# Patient Record
Sex: FEMALE | Race: WHITE | NOT HISPANIC OR LATINO | Employment: FULL TIME | URBAN - METROPOLITAN AREA
[De-identification: names, ages, dates, MRNs, and addresses within clinical notes are randomized per-mention and may not be internally consistent; named-entity substitution may affect disease eponyms.]

---

## 2018-12-11 ENCOUNTER — HOSPITAL ENCOUNTER (OUTPATIENT)
Dept: RADIOLOGY | Facility: HOSPITAL | Age: 33
Discharge: HOME | End: 2018-12-11
Attending: INTERNAL MEDICINE
Payer: COMMERCIAL

## 2018-12-11 ENCOUNTER — OFFICE VISIT (OUTPATIENT)
Dept: INTERNAL MEDICINE | Facility: CLINIC | Age: 33
End: 2018-12-11
Payer: COMMERCIAL

## 2018-12-11 VITALS
SYSTOLIC BLOOD PRESSURE: 113 MMHG | TEMPERATURE: 98 F | BODY MASS INDEX: 27.89 KG/M2 | HEART RATE: 61 BPM | RESPIRATION RATE: 16 BRPM | WEIGHT: 157.44 LBS | DIASTOLIC BLOOD PRESSURE: 82 MMHG | HEIGHT: 63 IN

## 2018-12-11 DIAGNOSIS — S69.92XA FINGER INJURY, LEFT, INITIAL ENCOUNTER: ICD-10-CM

## 2018-12-11 DIAGNOSIS — R09.82 POST-NASAL DRIP: ICD-10-CM

## 2018-12-11 DIAGNOSIS — Z00.00 ANNUAL PHYSICAL EXAM: Primary | ICD-10-CM

## 2018-12-11 PROCEDURE — 73140 X-RAY EXAM OF FINGER(S): CPT | Mod: 26,LT,, | Performed by: RADIOLOGY

## 2018-12-11 PROCEDURE — 99385 PREV VISIT NEW AGE 18-39: CPT | Mod: 25,S$GLB,, | Performed by: INTERNAL MEDICINE

## 2018-12-11 PROCEDURE — 90686 IIV4 VACC NO PRSV 0.5 ML IM: CPT | Mod: S$GLB,,, | Performed by: INTERNAL MEDICINE

## 2018-12-11 PROCEDURE — 73140 X-RAY EXAM OF FINGER(S): CPT | Mod: TC,PO,LT

## 2018-12-11 PROCEDURE — 99999 PR PBB SHADOW E&M-NEW PATIENT-LVL IV: CPT | Mod: PBBFAC,,, | Performed by: INTERNAL MEDICINE

## 2018-12-11 PROCEDURE — 90471 IMMUNIZATION ADMIN: CPT | Mod: S$GLB,,, | Performed by: INTERNAL MEDICINE

## 2018-12-11 RX ORDER — FLUTICASONE PROPIONATE 50 MCG
2 SPRAY, SUSPENSION (ML) NASAL DAILY
Qty: 16 G | Refills: 6 | Status: SHIPPED | OUTPATIENT
Start: 2018-12-11 | End: 2019-01-10

## 2018-12-11 RX ORDER — DESLORATADINE 5 MG/1
5 TABLET ORAL DAILY
Qty: 30 TABLET | Refills: 11 | Status: SHIPPED | OUTPATIENT
Start: 2018-12-11 | End: 2021-05-18

## 2018-12-11 NOTE — PROGRESS NOTES
Subjective:       Patient ID: Grecia Carson is a 33 y.o. female.    Chief Complaint: Annual Exam    HPI    33 y.o. female here for annual exam.     Health Maintenance:   Lipid disorders/ASCVD risk: due    DM: A1c due  Cervical Cancer (21-65y):  due      Vaccines:   Influenza (yearly) due   Tetanus (every 10 yrs - 1st tdap) tdap 2014         Medical Problems:  1. Cough, congestion, rhinorrhea, post nasal drip: symptoms have been present for a long time. She has tried an otc cough syrup without relief.   2. Left finger pain: she reports a puncture injury due to a palm tree about one year ago. She denies any retained foreign object. She reports pain in that spot if she has to carry something heavy.       History reviewed. No pertinent past medical history.  Past Surgical History:   Procedure Laterality Date    CATARACT EXTRACTION, BILATERAL  2017     Family History   Problem Relation Age of Onset    No Known Problems Mother     No Known Problems Father      Social History     Socioeconomic History    Marital status:      Spouse name: Not on file    Number of children: Not on file    Years of education: Not on file    Highest education level: Not on file   Social Needs    Financial resource strain: Not on file    Food insecurity - worry: Not on file    Food insecurity - inability: Not on file    Transportation needs - medical: Not on file    Transportation needs - non-medical: Not on file   Occupational History    Not on file   Tobacco Use    Smoking status: Never Smoker   Substance and Sexual Activity    Alcohol use: No    Drug use: No    Sexual activity: Not on file   Other Topics Concern    Not on file   Social History Narrative    Not on file     Review of patient's allergies indicates:  No Known Allergies    Current Outpatient Medications:     desloratadine (CLARINEX) 5 mg tablet, Take 1 tablet (5 mg total) by mouth once daily., Disp: 30 tablet, Rfl: 11    fluticasone (FLONASE) 50  "mcg/actuation nasal spray, 2 sprays (100 mcg total) by Each Nare route once daily., Disp: 16 g, Rfl: 6      Review of Systems   Constitutional: Negative for activity change and fever.   HENT: Positive for congestion, postnasal drip and rhinorrhea. Negative for sinus pain and trouble swallowing.    Eyes: Negative for discharge and redness.   Respiratory: Positive for cough. Negative for shortness of breath.    Cardiovascular: Negative for chest pain and leg swelling.   Gastrointestinal: Negative for abdominal pain, blood in stool, constipation and diarrhea.   Genitourinary: Negative for difficulty urinating and dysuria.   Musculoskeletal: Positive for arthralgias (distal left middle finger pain). Negative for gait problem and joint swelling.   Skin: Negative for pallor, rash and wound.   Neurological: Negative for numbness and headaches.       Objective:        Vitals:    12/11/18 1403   BP: 113/82   Pulse: 61   Resp: 16   Temp: 98.4 °F (36.9 °C)   TempSrc: Oral   Weight: 71.4 kg (157 lb 6.5 oz)   Height: 5' 3" (1.6 m)       Body mass index is 27.88 kg/m².    Physical Exam   Constitutional: She is oriented to person, place, and time. She appears well-developed. No distress.   HENT:   Head: Normocephalic and atraumatic.   Right Ear: Tympanic membrane normal.   Left Ear: Tympanic membrane normal.   Nose: Nose normal.   Mouth/Throat: Oropharynx is clear and moist.   Eyes: Conjunctivae and EOM are normal.   Neck: Normal range of motion. Neck supple. No tracheal deviation present. No thyromegaly present.   Cardiovascular: Normal rate, regular rhythm, normal heart sounds and intact distal pulses.   Pulmonary/Chest: Effort normal and breath sounds normal. No respiratory distress. She has no wheezes. She has no rales.   Abdominal: Soft. Bowel sounds are normal. She exhibits no distension. There is no tenderness.   Musculoskeletal: Normal range of motion. She exhibits no edema.        Left hand: She exhibits swelling (middle " finger- palmar side distal to DIP). She exhibits no tenderness and normal capillary refill.   Lymphadenopathy:     She has no cervical adenopathy.   Neurological: She is alert and oriented to person, place, and time. No sensory deficit.   Skin: Skin is warm and dry. She is not diaphoretic. No cyanosis. Nails show no clubbing.   Psychiatric: She has a normal mood and affect. Her behavior is normal. Judgment normal.       Assessment:     1. Annual physical exam    2. Post-nasal drip    3. Finger injury, left, initial encounter           Plan:          1. Annual physical exam  - flu shot today  - CBC auto differential; Future  - Comprehensive metabolic panel; Future  - Hemoglobin A1c; Future  - TSH; Future  - Lipid panel; Future  - Ambulatory Referral to Gynecology    2. Post-nasal drip  - fluticasone (FLONASE) 50 mcg/actuation nasal spray; 2 sprays (100 mcg total) by Each Nare route once daily.  Dispense: 16 g; Refill: 6  - desloratadine (CLARINEX) 5 mg tablet; Take 1 tablet (5 mg total) by mouth once daily.  Dispense: 30 tablet; Refill: 11    3. Finger injury, left, initial encounter  - X-Ray Finger 2 or More Views Left; Future           Patient note was created using MModal Dictation.  Any errors in syntax or even information may not have been identified and edited on initial review prior to signing this note.

## 2018-12-12 ENCOUNTER — PATIENT MESSAGE (OUTPATIENT)
Dept: INTERNAL MEDICINE | Facility: CLINIC | Age: 33
End: 2018-12-12

## 2019-09-21 ENCOUNTER — HOSPITAL ENCOUNTER (EMERGENCY)
Facility: HOSPITAL | Age: 34
Discharge: HOME | End: 2019-09-22
Attending: EMERGENCY MEDICINE
Payer: COMMERCIAL

## 2019-09-21 DIAGNOSIS — J40 BRONCHITIS: Primary | ICD-10-CM

## 2019-09-21 DIAGNOSIS — R05.9 COUGH: ICD-10-CM

## 2019-09-21 LAB
B-HCG UR QL: NEGATIVE
CTP QC/QA: YES
DEPRECATED S PYO AG THROAT QL EIA: NEGATIVE
INFLUENZA A, MOLECULAR: NEGATIVE
INFLUENZA B, MOLECULAR: NEGATIVE
SPECIMEN SOURCE: NORMAL

## 2019-09-21 PROCEDURE — 81025 URINE PREGNANCY TEST: CPT | Performed by: EMERGENCY MEDICINE

## 2019-09-21 PROCEDURE — 99284 EMERGENCY DEPT VISIT MOD MDM: CPT | Mod: 25

## 2019-09-21 PROCEDURE — 87081 CULTURE SCREEN ONLY: CPT

## 2019-09-21 PROCEDURE — 87502 INFLUENZA DNA AMP PROBE: CPT

## 2019-09-21 PROCEDURE — 87880 STREP A ASSAY W/OPTIC: CPT

## 2019-09-21 RX ORDER — ALBUTEROL SULFATE 90 UG/1
1-2 AEROSOL, METERED RESPIRATORY (INHALATION) EVERY 6 HOURS PRN
Qty: 1 INHALER | Refills: 0 | Status: SHIPPED | OUTPATIENT
Start: 2019-09-21 | End: 2021-05-18

## 2019-09-21 RX ORDER — IBUPROFEN 600 MG/1
600 TABLET ORAL EVERY 6 HOURS PRN
Qty: 20 TABLET | Refills: 0 | Status: SHIPPED | OUTPATIENT
Start: 2019-09-21 | End: 2021-05-18

## 2019-09-21 RX ORDER — BENZONATATE 100 MG/1
100 CAPSULE ORAL 3 TIMES DAILY PRN
Qty: 20 CAPSULE | Refills: 0 | Status: SHIPPED | OUTPATIENT
Start: 2019-09-21 | End: 2019-10-01

## 2019-09-22 VITALS
HEART RATE: 88 BPM | RESPIRATION RATE: 20 BRPM | DIASTOLIC BLOOD PRESSURE: 77 MMHG | SYSTOLIC BLOOD PRESSURE: 119 MMHG | TEMPERATURE: 98 F | BODY MASS INDEX: 31.76 KG/M2 | WEIGHT: 186 LBS | OXYGEN SATURATION: 100 % | HEIGHT: 64 IN

## 2019-09-23 NOTE — ED PROVIDER NOTES
Encounter Date: 9/21/2019       History     Chief Complaint   Patient presents with    Sore Throat     Patient presents to the ED with reports of having a sore throat with body aches, and cough with yellow sputum production that started yesterday.     Generalized Body Aches    Otalgia     bilaterally     Took an antibiotic purchase at a flea market with no relief.    The history is provided by the patient.   URI   The primary symptoms include fever (Subjective), sore throat and cough. Primary symptoms do not include vomiting. The current episode started yesterday. This is a new problem. The problem has not changed since onset.  There is nondescript sputum produced.   The onset of the illness is associated with exposure to sick contacts.     Review of patient's allergies indicates:  No Known Allergies  No past medical history on file.  Past Surgical History:   Procedure Laterality Date    CATARACT EXTRACTION, BILATERAL  2017     Family History   Problem Relation Age of Onset    No Known Problems Mother     No Known Problems Father      Social History     Tobacco Use    Smoking status: Never Smoker   Substance Use Topics    Alcohol use: No    Drug use: No     Review of Systems   Constitutional: Positive for fever (Subjective).   HENT: Positive for sore throat.    Respiratory: Positive for cough. Negative for shortness of breath.    Cardiovascular: Negative for chest pain and palpitations.   Gastrointestinal: Negative for vomiting.   Musculoskeletal: Negative for back pain.       Physical Exam     Initial Vitals [09/21/19 2054]   BP Pulse Resp Temp SpO2   136/65 79 20 98.9 °F (37.2 °C) 100 %      MAP       --         Physical Exam    Nursing note and vitals reviewed.  Constitutional: She appears well-developed and well-nourished.   HENT:   Head: Normocephalic.   Mouth/Throat: Oropharynx is clear and moist.   Eyes: Conjunctivae and EOM are normal.   Neck: Normal range of motion. Neck supple.   Cardiovascular:  Normal rate, regular rhythm, normal heart sounds and intact distal pulses.   No murmur heard.  Pulmonary/Chest: Breath sounds normal. No respiratory distress.   Abdominal: Soft.   Musculoskeletal: Normal range of motion. She exhibits no edema or tenderness.   Neurological: She is alert and oriented to person, place, and time. She has normal strength.   Skin: Skin is warm and dry. Capillary refill takes less than 2 seconds. No rash noted.   Psychiatric: She has a normal mood and affect.         ED Course   Procedures  Labs Reviewed   INFLUENZA A & B BY MOLECULAR   THROAT SCREEN, RAPID   CULTURE, STREP A,  THROAT   POCT URINE PREGNANCY          Imaging Results          X-Ray Chest PA And Lateral (Final result)  Result time 09/21/19 23:13:21    Final result by Jimy Sewell MD (09/21/19 23:13:21)                 Impression:      Normal.      Electronically signed by: Jimy Sewell  Date:    09/21/2019  Time:    23:13             Narrative:    EXAMINATION:  XR CHEST PA AND LATERAL    CLINICAL HISTORY:  Cough    TECHNIQUE:  PA and lateral views of the chest were performed.    COMPARISON:  None    FINDINGS:  Frontal lateral views.  Heart and lungs are normal.  No pneumothorax or pleural effusion.  Hilar shadows and trachea appear normal.  Visualized spine and abdomen appear normal.                              X-Rays:   Independently Interpreted Readings:   Chest X-Ray: No acute abnormalities.     Medical Decision Making:   Differential Diagnosis:   Differential Diagnosis includes, but is not limited to:  Sepsis, meningitis, cavernous sinus thrombosis, nasal foreign body, otitis media/external, nasal polyp, bacterial sinusitis, allergic rhinitis, influenza, bacterial/viral pharyngitis, peritonsillar abscess, retropharyngeal abscess, bacterial/viral pneumonia.    Independently Interpreted Test(s):   I have ordered and independently interpreted X-rays - see prior notes.  Clinical Tests:   Lab Tests: Reviewed and  Ordered  Radiological Study: Ordered and Reviewed  ED Management:  After complete evaluation, including thorough history and physical exam, the patient's symptoms are most likely due to viral upper respiratory infection. There are no concerning features on physical exam to suggest bacterial otitis media/externa, sinusitis, pharyngitis, or peritonsillar abscess. Vital signs do not suggest sepsis. Lung sounds are clear and not consistent with pneumonia. There is no neck pain or limited ROM to suggest retropharyngeal abscess or meningitis. The patient will be treated with supportive care. Will provide RX for symptomatic care upon D/C.                        Clinical Impression:       ICD-10-CM ICD-9-CM   1. Bronchitis J40 490   2. Cough R05 786.2         Disposition:   Disposition: Discharged  Condition: Stable     .                   Guy J. Lefort, MD  09/23/19 0135

## 2019-09-24 LAB — BACTERIA THROAT CULT: NORMAL

## 2020-10-05 ENCOUNTER — PATIENT MESSAGE (OUTPATIENT)
Dept: ADMINISTRATIVE | Facility: HOSPITAL | Age: 35
End: 2020-10-05

## 2021-01-04 ENCOUNTER — PATIENT MESSAGE (OUTPATIENT)
Dept: ADMINISTRATIVE | Facility: HOSPITAL | Age: 36
End: 2021-01-04

## 2021-03-30 ENCOUNTER — IMMUNIZATION (OUTPATIENT)
Dept: PRIMARY CARE CLINIC | Facility: CLINIC | Age: 36
End: 2021-03-30
Payer: COMMERCIAL

## 2021-03-30 DIAGNOSIS — Z23 NEED FOR VACCINATION: Primary | ICD-10-CM

## 2021-03-30 PROCEDURE — 91301 PR SARS-COV-2 COVID-19 VACCINE, NO PRSV, 100MCG/0.5ML, IM: ICD-10-PCS | Mod: S$GLB,,, | Performed by: INTERNAL MEDICINE

## 2021-03-30 PROCEDURE — 91301 PR SARS-COV-2 COVID-19 VACCINE, NO PRSV, 100MCG/0.5ML, IM: CPT | Mod: S$GLB,,, | Performed by: INTERNAL MEDICINE

## 2021-03-30 PROCEDURE — 0011A PR IMMUNIZ ADMIN, SARS-COV-2 COVID-19 VACC, 100MCG/0.5ML, 1ST DOSE: CPT | Mod: CV19,S$GLB,, | Performed by: INTERNAL MEDICINE

## 2021-03-30 PROCEDURE — 0011A PR IMMUNIZ ADMIN, SARS-COV-2 COVID-19 VACC, 100MCG/0.5ML, 1ST DOSE: ICD-10-PCS | Mod: CV19,S$GLB,, | Performed by: INTERNAL MEDICINE

## 2021-03-30 RX ADMIN — Medication 0.5 ML: at 08:03

## 2021-04-05 ENCOUNTER — PATIENT MESSAGE (OUTPATIENT)
Dept: ADMINISTRATIVE | Facility: HOSPITAL | Age: 36
End: 2021-04-05

## 2021-04-28 ENCOUNTER — IMMUNIZATION (OUTPATIENT)
Dept: PRIMARY CARE CLINIC | Facility: CLINIC | Age: 36
End: 2021-04-28
Payer: COMMERCIAL

## 2021-04-28 DIAGNOSIS — Z23 NEED FOR VACCINATION: Primary | ICD-10-CM

## 2021-04-28 PROCEDURE — 91301 PR SARS-COV-2 COVID-19 VACCINE, NO PRSV, 100MCG/0.5ML, IM: CPT | Mod: S$GLB,,, | Performed by: INTERNAL MEDICINE

## 2021-04-28 PROCEDURE — 0012A PR IMMUNIZ ADMIN, SARS-COV-2 COVID-19 VACC, 100MCG/0.5ML, 2ND DOSE: CPT | Mod: CV19,S$GLB,, | Performed by: INTERNAL MEDICINE

## 2021-04-28 PROCEDURE — 0012A PR IMMUNIZ ADMIN, SARS-COV-2 COVID-19 VACC, 100MCG/0.5ML, 2ND DOSE: ICD-10-PCS | Mod: CV19,S$GLB,, | Performed by: INTERNAL MEDICINE

## 2021-04-28 PROCEDURE — 91301 PR SARS-COV-2 COVID-19 VACCINE, NO PRSV, 100MCG/0.5ML, IM: ICD-10-PCS | Mod: S$GLB,,, | Performed by: INTERNAL MEDICINE

## 2021-04-28 RX ADMIN — Medication 0.5 ML: at 08:04

## 2021-05-18 ENCOUNTER — OFFICE VISIT (OUTPATIENT)
Dept: OBSTETRICS AND GYNECOLOGY | Facility: CLINIC | Age: 36
End: 2021-05-18
Payer: COMMERCIAL

## 2021-05-18 ENCOUNTER — TELEPHONE (OUTPATIENT)
Dept: OBSTETRICS AND GYNECOLOGY | Facility: CLINIC | Age: 36
End: 2021-05-18

## 2021-05-18 VITALS — BODY MASS INDEX: 23.22 KG/M2 | SYSTOLIC BLOOD PRESSURE: 104 MMHG | WEIGHT: 136 LBS | DIASTOLIC BLOOD PRESSURE: 66 MMHG

## 2021-05-18 DIAGNOSIS — Z12.4 SCREENING FOR CERVICAL CANCER: ICD-10-CM

## 2021-05-18 DIAGNOSIS — R30.0 DYSURIA: ICD-10-CM

## 2021-05-18 DIAGNOSIS — Z01.419 WELL WOMAN EXAM WITH ROUTINE GYNECOLOGICAL EXAM: Primary | ICD-10-CM

## 2021-05-18 LAB
BILIRUB SERPL-MCNC: ABNORMAL MG/DL
BLOOD URINE, POC: ABNORMAL
CLARITY, POC UA: ABNORMAL
COLOR, POC UA: ABNORMAL
GLUCOSE UR QL STRIP: ABNORMAL
KETONES UR QL STRIP: ABNORMAL
LEUKOCYTE ESTERASE URINE, POC: ABNORMAL
NITRITE, POC UA: ABNORMAL
PH, POC UA: ABNORMAL
PROTEIN, POC: ABNORMAL
SPECIFIC GRAVITY, POC UA: ABNORMAL
UROBILINOGEN, POC UA: ABNORMAL

## 2021-05-18 PROCEDURE — 88175 CYTOPATH C/V AUTO FLUID REDO: CPT | Performed by: OBSTETRICS & GYNECOLOGY

## 2021-05-18 PROCEDURE — 87186 SC STD MICRODIL/AGAR DIL: CPT | Performed by: OBSTETRICS & GYNECOLOGY

## 2021-05-18 PROCEDURE — 87088 URINE BACTERIA CULTURE: CPT | Performed by: OBSTETRICS & GYNECOLOGY

## 2021-05-18 PROCEDURE — 87086 URINE CULTURE/COLONY COUNT: CPT | Performed by: OBSTETRICS & GYNECOLOGY

## 2021-05-18 PROCEDURE — 3008F BODY MASS INDEX DOCD: CPT | Mod: CPTII,S$GLB,, | Performed by: OBSTETRICS & GYNECOLOGY

## 2021-05-18 PROCEDURE — 99385 PREV VISIT NEW AGE 18-39: CPT | Mod: 25,S$GLB,, | Performed by: OBSTETRICS & GYNECOLOGY

## 2021-05-18 PROCEDURE — 1125F PR PAIN SEVERITY QUANTIFIED, PAIN PRESENT: ICD-10-PCS | Mod: S$GLB,,, | Performed by: OBSTETRICS & GYNECOLOGY

## 2021-05-18 PROCEDURE — 81002 URINALYSIS NONAUTO W/O SCOPE: CPT | Mod: S$GLB,,, | Performed by: OBSTETRICS & GYNECOLOGY

## 2021-05-18 PROCEDURE — 1125F AMNT PAIN NOTED PAIN PRSNT: CPT | Mod: S$GLB,,, | Performed by: OBSTETRICS & GYNECOLOGY

## 2021-05-18 PROCEDURE — 99999 PR PBB SHADOW E&M-EST. PATIENT-LVL II: ICD-10-PCS | Mod: PBBFAC,,, | Performed by: OBSTETRICS & GYNECOLOGY

## 2021-05-18 PROCEDURE — 87624 HPV HI-RISK TYP POOLED RSLT: CPT | Performed by: OBSTETRICS & GYNECOLOGY

## 2021-05-18 PROCEDURE — 81002 POCT URINE DIPSTICK WITHOUT MICROSCOPE: ICD-10-PCS | Mod: S$GLB,,, | Performed by: OBSTETRICS & GYNECOLOGY

## 2021-05-18 PROCEDURE — 99999 PR PBB SHADOW E&M-EST. PATIENT-LVL II: CPT | Mod: PBBFAC,,, | Performed by: OBSTETRICS & GYNECOLOGY

## 2021-05-18 PROCEDURE — 99385 PR PREVENTIVE VISIT,NEW,18-39: ICD-10-PCS | Mod: 25,S$GLB,, | Performed by: OBSTETRICS & GYNECOLOGY

## 2021-05-18 PROCEDURE — 87077 CULTURE AEROBIC IDENTIFY: CPT | Performed by: OBSTETRICS & GYNECOLOGY

## 2021-05-18 PROCEDURE — 3008F PR BODY MASS INDEX (BMI) DOCUMENTED: ICD-10-PCS | Mod: CPTII,S$GLB,, | Performed by: OBSTETRICS & GYNECOLOGY

## 2021-05-18 RX ORDER — NITROFURANTOIN 25; 75 MG/1; MG/1
100 CAPSULE ORAL 2 TIMES DAILY
Qty: 14 CAPSULE | Refills: 0 | Status: SHIPPED | OUTPATIENT
Start: 2021-05-18 | End: 2021-05-25

## 2021-05-18 RX ORDER — COPPER 313.4 MG/1
INTRAUTERINE DEVICE INTRAUTERINE
COMMUNITY
Start: 2020-06-30 | End: 2024-02-15

## 2021-05-21 LAB — BACTERIA UR CULT: ABNORMAL

## 2021-05-22 LAB
FINAL PATHOLOGIC DIAGNOSIS: NORMAL
Lab: NORMAL

## 2021-05-25 LAB
HPV HR 12 DNA SPEC QL NAA+PROBE: NEGATIVE
HPV16 AG SPEC QL: NEGATIVE
HPV18 DNA SPEC QL NAA+PROBE: NEGATIVE

## 2021-07-06 ENCOUNTER — PATIENT MESSAGE (OUTPATIENT)
Dept: ADMINISTRATIVE | Facility: HOSPITAL | Age: 36
End: 2021-07-06

## 2021-10-04 ENCOUNTER — PATIENT MESSAGE (OUTPATIENT)
Dept: ADMINISTRATIVE | Facility: HOSPITAL | Age: 36
End: 2021-10-04

## 2022-03-24 ENCOUNTER — PATIENT MESSAGE (OUTPATIENT)
Dept: INTERNAL MEDICINE | Facility: CLINIC | Age: 37
End: 2022-03-24
Payer: COMMERCIAL

## 2022-05-31 ENCOUNTER — OFFICE VISIT (OUTPATIENT)
Dept: INTERNAL MEDICINE | Facility: CLINIC | Age: 37
End: 2022-05-31
Payer: COMMERCIAL

## 2022-05-31 ENCOUNTER — TELEPHONE (OUTPATIENT)
Dept: INTERNAL MEDICINE | Facility: CLINIC | Age: 37
End: 2022-05-31

## 2022-05-31 VITALS
SYSTOLIC BLOOD PRESSURE: 110 MMHG | BODY MASS INDEX: 26.34 KG/M2 | WEIGHT: 154.31 LBS | TEMPERATURE: 99 F | DIASTOLIC BLOOD PRESSURE: 66 MMHG | OXYGEN SATURATION: 98 % | HEART RATE: 62 BPM | HEIGHT: 64 IN

## 2022-05-31 DIAGNOSIS — Z12.31 ENCOUNTER FOR SCREENING MAMMOGRAM FOR MALIGNANT NEOPLASM OF BREAST: ICD-10-CM

## 2022-05-31 DIAGNOSIS — Z00.00 WELLNESS EXAMINATION: Primary | ICD-10-CM

## 2022-05-31 DIAGNOSIS — Z11.59 ENCOUNTER FOR HEPATITIS C SCREENING TEST FOR LOW RISK PATIENT: ICD-10-CM

## 2022-05-31 DIAGNOSIS — N60.19 FIBROCYSTIC BREAST DISEASE (FCBD), UNSPECIFIED LATERALITY: ICD-10-CM

## 2022-05-31 PROCEDURE — 3008F BODY MASS INDEX DOCD: CPT | Mod: CPTII,S$GLB,, | Performed by: INTERNAL MEDICINE

## 2022-05-31 PROCEDURE — 3078F PR MOST RECENT DIASTOLIC BLOOD PRESSURE < 80 MM HG: ICD-10-PCS | Mod: CPTII,S$GLB,, | Performed by: INTERNAL MEDICINE

## 2022-05-31 PROCEDURE — 1159F PR MEDICATION LIST DOCUMENTED IN MEDICAL RECORD: ICD-10-PCS | Mod: CPTII,S$GLB,, | Performed by: INTERNAL MEDICINE

## 2022-05-31 PROCEDURE — 1159F MED LIST DOCD IN RCRD: CPT | Mod: CPTII,S$GLB,, | Performed by: INTERNAL MEDICINE

## 2022-05-31 PROCEDURE — 99999 PR PBB SHADOW E&M-EST. PATIENT-LVL III: CPT | Mod: PBBFAC,,, | Performed by: INTERNAL MEDICINE

## 2022-05-31 PROCEDURE — 99999 PR PBB SHADOW E&M-EST. PATIENT-LVL III: ICD-10-PCS | Mod: PBBFAC,,, | Performed by: INTERNAL MEDICINE

## 2022-05-31 PROCEDURE — 3074F PR MOST RECENT SYSTOLIC BLOOD PRESSURE < 130 MM HG: ICD-10-PCS | Mod: CPTII,S$GLB,, | Performed by: INTERNAL MEDICINE

## 2022-05-31 PROCEDURE — 3078F DIAST BP <80 MM HG: CPT | Mod: CPTII,S$GLB,, | Performed by: INTERNAL MEDICINE

## 2022-05-31 PROCEDURE — 99385 PREV VISIT NEW AGE 18-39: CPT | Mod: S$GLB,,, | Performed by: INTERNAL MEDICINE

## 2022-05-31 PROCEDURE — 3074F SYST BP LT 130 MM HG: CPT | Mod: CPTII,S$GLB,, | Performed by: INTERNAL MEDICINE

## 2022-05-31 PROCEDURE — 99385 PR PREVENTIVE VISIT,NEW,18-39: ICD-10-PCS | Mod: S$GLB,,, | Performed by: INTERNAL MEDICINE

## 2022-05-31 PROCEDURE — 3008F PR BODY MASS INDEX (BMI) DOCUMENTED: ICD-10-PCS | Mod: CPTII,S$GLB,, | Performed by: INTERNAL MEDICINE

## 2022-05-31 NOTE — LETTER
AUTHORIZATION FOR RELEASE OF   CONFIDENTIAL INFORMATION    Dear EJIM1,    We are seeing Grecia Carson, date of birth 1985, in the clinic at [facility]. Bella Thomas MD is the patient's PCP. Grecia Carson has an outstanding lab/procedure at the time we reviewed her chart. In order to help keep her health information updated, she has authorized us to request the following medical record(s):        (X)  MAMMOGRAM                                      (  )  COLONOSCOPY      (  )  PAP SMEAR                                          (  )  OUTSIDE LAB RESULTS     (  )  DEXA SCAN                                          (  )  EYE EXAM            (  )  FOOT EXAM                                          (  )  ENTIRE RECORD     (  )  OUTSIDE IMMUNIZATIONS                 (X)  Last 3 most recent MMG         Please fax records to [facility], Abby E. Gandolfi, MD, [phone]     If you have any questions, please contact Alessandra at [phone].           Patient Name: Grecia Carson  : 1985  Patient Phone #: [phone]

## 2022-05-31 NOTE — PROGRESS NOTES
"[facility] Note    Chief Complaint      Chief Complaint   Patient presents with    Establish Care     History of Present Illness      Grecia Carson is a 36 y.o. female who presents today for chief complaint annual wellness  HPI   Annual wellness feels well today no complaints  [place] been in [place]  No recent labs  Pap: 5.21 [facility], is changing to Dr Julian   Never smoker     Has been having annual mmg at [facility], encounters in care everywhere did initial mmg considering implants   Works as  I personally reviewed all past medical, surgical, social and family history.  Dad  not due to medical issues, mom is healthy     Mood is ok, sleep is ok, watches diet, exercises   Job is active is a  in [employer]     Has 8 year old daughter     Health Maintenance   Topic Date Due    Hepatitis C Screening  Never done    TETANUS VACCINE  2024    Lipid Panel  Completed       History reviewed. No pertinent past medical history.    Past Surgical History:   Procedure Laterality Date    CATARACT EXTRACTION, BILATERAL  2017       family history includes No Known Problems in her father and mother.    Social History     Tobacco Use    Smoking status: Never Smoker    Smokeless tobacco: Never Used   Substance Use Topics    Alcohol use: Yes    Drug use: No       Review of Systems   Constitutional: Negative for chills, fever, malaise/fatigue and weight loss.   Respiratory: Negative for cough, sputum production, shortness of breath and wheezing.    Cardiovascular: Negative for chest pain, palpitations, orthopnea and leg swelling.   Gastrointestinal: Negative for constipation, diarrhea, nausea and vomiting.   Genitourinary: Negative for dysuria, frequency, hematuria and urgency.        Outpatient Encounter Medications as of 2022   Medication Sig Dispense Refill    copper intrauterine device (PARAGARD T 380A) 380 square mm IUD   0, 78.5       No " "facility-administered encounter medications on file as of 5/31/2022.        Review of patient's allergies indicates:  No Known Allergies        Physical Exam      Vital Signs  Temp: 99.1 °F (37.3 °C)  Temp src: Oral  Pulse: 62  SpO2: 98 %  BP: 110/66  BP Location: Right arm  Patient Position: Sitting  Pain Score: 0-No pain  Height and Weight  Height: 5' 4.1" (162.8 cm)  Weight: 70 kg (154 lb 5.2 oz)  BSA (Calculated - sq m): 1.78 sq meters  BMI (Calculated): 26.4  Weight in (lb) to have BMI = 25: 145.8]    Physical Exam  Vitals reviewed.   Constitutional:       General: She is not in acute distress.     Appearance: She is well-developed. She is not diaphoretic.   HENT:      Head: Normocephalic and atraumatic.      Right Ear: External ear normal.      Left Ear: External ear normal.      Nose: Nose normal.   Eyes:      General:         Right eye: No discharge.         Left eye: No discharge.      Conjunctiva/sclera: Conjunctivae normal.   Cardiovascular:      Rate and Rhythm: Normal rate and regular rhythm.      Heart sounds: Normal heart sounds.   Pulmonary:      Effort: Pulmonary effort is normal. No respiratory distress.      Breath sounds: Normal breath sounds. No wheezing or rales.   Musculoskeletal:         General: Normal range of motion.      Cervical back: Normal range of motion.   Skin:     Coloration: Skin is not pale.      Findings: No rash.   Neurological:      Mental Status: She is alert and oriented to person, place, and time.   Psychiatric:         Behavior: Behavior normal.         Thought Content: Thought content normal.         Judgment: Judgment normal.        Laboratory & Radiology:  No recent for review       Assessment/Plan     Grecia Carson is a 36 y.o.female with:  Wellness  Annual labs     Fibrocystic breast  Unclear hx  Get mmg and discuss dc imaging     1. Wellness examination  -     Lipid Panel  -     Comprehensive Metabolic Panel  -     CBC Without Differential    2. Encounter for hepatitis " C screening test for low risk patient  -     Hepatitis C Antibody    3. Encounter for screening mammogram for malignant neoplasm of breast  -     Mammo Digital Screening Bilat w/ Dat    4. Fibrocystic breast disease (FCBD), unspecified laterality  Assessment & Plan:  Unclear hx  Get mmg and discuss dc imaging     Orders:  -     Mammo Digital Screening Bilat w/ Dat      Orders Placed This Encounter   Procedures    Mammo Digital Screening Bilat w/ Dat     Standing Status:   Future     Standing Expiration Date:   7/31/2023     Order Specific Question:   May the Radiologist modify the order per protocol to meet the clinical needs of the patient?     Answer:   Yes    Lipid Panel     Standing Status:   Future     Standing Expiration Date:   7/30/2023    Comprehensive Metabolic Panel     Standing Status:   Future     Standing Expiration Date:   7/30/2023    Hepatitis C Antibody     Standing Status:   Future     Standing Expiration Date:   7/30/2023     Order Specific Question:   Release to patient     Answer:   Immediate    CBC Without Differential     Standing Status:   Future     Standing Expiration Date:   7/30/2023       Use of the [email or web address] Patient Portal discussed and encouraged during today's visit  -Continue current medications and maintain follow up with specialists.  Return to clinic in 12 months.  Future Appointments   Date Time Provider Department Center   6/22/2022  7:45 AM [facility] MAMMO1 [facility] MAMMO [facility]   7/26/2022 10:15 AM Anisha Julian MD [facility]       Bella Thomas MD  5/31/2022 12:47 PM    [facility]

## 2022-06-15 ENCOUNTER — PATIENT OUTREACH (OUTPATIENT)
Dept: ADMINISTRATIVE | Facility: HOSPITAL | Age: 37
End: 2022-06-15
Payer: COMMERCIAL

## 2022-06-15 NOTE — PROGRESS NOTES
Health Maintenance Due   Topic Date Due    Hepatitis C Screening  Never done    HIV Screening  Never done    COVID-19 Vaccine (3 - Booster for Moderna series) 09/28/2021     Triggered LINKS & reconciled immunizations. Updated Care Everywhere. Scanned in outside mammography results into chart. Chart review completed.

## 2022-06-22 ENCOUNTER — HOSPITAL ENCOUNTER (OUTPATIENT)
Dept: RADIOLOGY | Facility: HOSPITAL | Age: 37
Discharge: HOME | End: 2022-06-22
Attending: INTERNAL MEDICINE
Payer: COMMERCIAL

## 2022-06-22 DIAGNOSIS — N60.19 FIBROCYSTIC BREAST DISEASE (FCBD), UNSPECIFIED LATERALITY: ICD-10-CM

## 2022-06-22 DIAGNOSIS — Z12.31 ENCOUNTER FOR SCREENING MAMMOGRAM FOR MALIGNANT NEOPLASM OF BREAST: ICD-10-CM

## 2022-06-22 PROCEDURE — 77063 BREAST TOMOSYNTHESIS BI: CPT | Mod: TC

## 2022-06-22 PROCEDURE — 77063 MAMMO DIGITAL SCREENING BILAT WITH TOMO: ICD-10-PCS | Mod: 26,,, | Performed by: RADIOLOGY

## 2022-06-22 PROCEDURE — 77067 SCR MAMMO BI INCL CAD: CPT | Mod: 26,,, | Performed by: RADIOLOGY

## 2022-06-22 PROCEDURE — 77067 SCR MAMMO BI INCL CAD: CPT | Mod: TC

## 2022-06-22 PROCEDURE — 77063 BREAST TOMOSYNTHESIS BI: CPT | Mod: 26,,, | Performed by: RADIOLOGY

## 2022-06-22 PROCEDURE — 77067 MAMMO DIGITAL SCREENING BILAT WITH TOMO: ICD-10-PCS | Mod: 26,,, | Performed by: RADIOLOGY

## 2022-07-08 ENCOUNTER — PATIENT MESSAGE (OUTPATIENT)
Dept: INTERNAL MEDICINE | Facility: CLINIC | Age: 37
End: 2022-07-08
Payer: COMMERCIAL

## 2022-07-22 ENCOUNTER — TELEPHONE (OUTPATIENT)
Dept: DERMATOLOGY | Facility: CLINIC | Age: 37
End: 2022-07-22
Payer: COMMERCIAL

## 2022-07-22 NOTE — TELEPHONE ENCOUNTER
----- Message from Alanna Garcia sent at 7/22/2022 12:23 PM CDT -----  Contact: . [phone]  the patient is calling to get scheduled for a appt.( Skin check) Pt works in the [place].  Pt access tried but no appts are available.  the patient can be reached at. [phone]

## 2022-07-22 NOTE — TELEPHONE ENCOUNTER
Spoke with pt and the offered pt an appointment for Monday 2pm at the [facility] location. Pt declined appointment and stated she only wants to go to [facility] or go to the [facility] location. I explained to the pt that there are no appointments until November at both of those locations. Pt asked is there a dermatologist in [place] or [place] and I explained that we do not have any dermatologist in [place] or in [place]. Pt stated she will not schedule at this time and will call back if she us unable to find another dermatologist.

## 2022-07-26 ENCOUNTER — HOSPITAL ENCOUNTER (OUTPATIENT)
Dept: RADIOLOGY | Facility: HOSPITAL | Age: 37
Discharge: HOME | End: 2022-07-26
Attending: INTERNAL MEDICINE
Payer: COMMERCIAL

## 2022-07-26 ENCOUNTER — OFFICE VISIT (OUTPATIENT)
Dept: OBSTETRICS AND GYNECOLOGY | Facility: CLINIC | Age: 37
End: 2022-07-26
Attending: OBSTETRICS & GYNECOLOGY
Payer: COMMERCIAL

## 2022-07-26 VITALS
DIASTOLIC BLOOD PRESSURE: 80 MMHG | SYSTOLIC BLOOD PRESSURE: 120 MMHG | WEIGHT: 157.31 LBS | HEIGHT: 64 IN | BODY MASS INDEX: 26.85 KG/M2

## 2022-07-26 DIAGNOSIS — R92.8 ABNORMAL MAMMOGRAM: ICD-10-CM

## 2022-07-26 DIAGNOSIS — Z01.419 ENCOUNTER FOR GYNECOLOGICAL EXAMINATION: Primary | ICD-10-CM

## 2022-07-26 DIAGNOSIS — N94.3 PMS (PREMENSTRUAL SYNDROME): ICD-10-CM

## 2022-07-26 PROCEDURE — 77061 MAMMO DIGITAL DIAGNOSTIC RIGHT WITH TOMO: ICD-10-PCS | Mod: 26,RT,, | Performed by: RADIOLOGY

## 2022-07-26 PROCEDURE — 1159F PR MEDICATION LIST DOCUMENTED IN MEDICAL RECORD: ICD-10-PCS | Mod: CPTII,S$GLB,, | Performed by: OBSTETRICS & GYNECOLOGY

## 2022-07-26 PROCEDURE — 3074F PR MOST RECENT SYSTOLIC BLOOD PRESSURE < 130 MM HG: ICD-10-PCS | Mod: CPTII,S$GLB,, | Performed by: OBSTETRICS & GYNECOLOGY

## 2022-07-26 PROCEDURE — 77065 DX MAMMO INCL CAD UNI: CPT | Mod: 26,RT,, | Performed by: RADIOLOGY

## 2022-07-26 PROCEDURE — 99999 PR PBB SHADOW E&M-EST. PATIENT-LVL III: CPT | Mod: PBBFAC,,, | Performed by: OBSTETRICS & GYNECOLOGY

## 2022-07-26 PROCEDURE — 3074F SYST BP LT 130 MM HG: CPT | Mod: CPTII,S$GLB,, | Performed by: OBSTETRICS & GYNECOLOGY

## 2022-07-26 PROCEDURE — 3008F BODY MASS INDEX DOCD: CPT | Mod: CPTII,S$GLB,, | Performed by: OBSTETRICS & GYNECOLOGY

## 2022-07-26 PROCEDURE — 99395 PR PREVENTIVE VISIT,EST,18-39: ICD-10-PCS | Mod: S$GLB,,, | Performed by: OBSTETRICS & GYNECOLOGY

## 2022-07-26 PROCEDURE — 99395 PREV VISIT EST AGE 18-39: CPT | Mod: S$GLB,,, | Performed by: OBSTETRICS & GYNECOLOGY

## 2022-07-26 PROCEDURE — 3079F DIAST BP 80-89 MM HG: CPT | Mod: CPTII,S$GLB,, | Performed by: OBSTETRICS & GYNECOLOGY

## 2022-07-26 PROCEDURE — 3008F PR BODY MASS INDEX (BMI) DOCUMENTED: ICD-10-PCS | Mod: CPTII,S$GLB,, | Performed by: OBSTETRICS & GYNECOLOGY

## 2022-07-26 PROCEDURE — 3079F PR MOST RECENT DIASTOLIC BLOOD PRESSURE 80-89 MM HG: ICD-10-PCS | Mod: CPTII,S$GLB,, | Performed by: OBSTETRICS & GYNECOLOGY

## 2022-07-26 PROCEDURE — 77065 DX MAMMO INCL CAD UNI: CPT | Mod: TC,RT

## 2022-07-26 PROCEDURE — 99999 PR PBB SHADOW E&M-EST. PATIENT-LVL III: ICD-10-PCS | Mod: PBBFAC,,, | Performed by: OBSTETRICS & GYNECOLOGY

## 2022-07-26 PROCEDURE — 1159F MED LIST DOCD IN RCRD: CPT | Mod: CPTII,S$GLB,, | Performed by: OBSTETRICS & GYNECOLOGY

## 2022-07-26 PROCEDURE — 77061 BREAST TOMOSYNTHESIS UNI: CPT | Mod: 26,RT,, | Performed by: RADIOLOGY

## 2022-07-26 PROCEDURE — 77065 MAMMO DIGITAL DIAGNOSTIC RIGHT WITH TOMO: ICD-10-PCS | Mod: 26,RT,, | Performed by: RADIOLOGY

## 2022-07-26 RX ORDER — PROGESTERONE 100 MG/1
CAPSULE ORAL
Qty: 90 CAPSULE | Refills: 3 | Status: SHIPPED | OUTPATIENT
Start: 2022-07-26 | End: 2022-08-21 | Stop reason: SDUPTHER

## 2022-07-26 NOTE — LETTER
July 26, 2022      [facility]  [address]  [place] [address]  Phone: [phone]  Fax: [phone]       Patient: Grecia Carson   YOB: 1985  Date of Visit: 07/26/2022    To Whom It May Concern:    Ailyn Carson  was at [facility] on 07/26/2022. The patient may return to work 7/27/22 without any restrictions. If you have any questions or concerns, or if I can be of further assistance, please do not hesitate to contact me.    Sincerely,          Anisha Julian MD

## 2022-07-26 NOTE — PROGRESS NOTES
Subjective:       Patient ID: Grecia Carson is a 36 y.o. female.    Chief Complaint:  No chief complaint on file.      History of Present Illness.  Grecia Carson is a 36 y.o. female.  She has no breast or urinary symptoms.  She has no menorrhagia, oligomenorrhea, bleeding betweeen menses, postcoital bleeding, dysmenorrhea, pelvic pain, dyspareunia, vaginal dryness, vaginal discharge, or sexual complaints.  Her periods are every 28 days and last 6 days.  She is sexually active.  She is currently using Paragard (2018) for contraception.  She has cravings before her period every month.    GYN & OB History  Patient's last menstrual period was 2022.   Last Pap: 2021 Normal HPV neg  Gardasil:  Yes    OB History    Para Term  AB Living   1 1 0 1   1   SAB IAB Ectopic Multiple Live Births           1      # Outcome Date GA Lbr Rohit/2nd Weight Sex Delivery Anes PTL Lv   1  14 36w0d  3.572 kg (7 lb 14 oz) F Vag-Spont   OLENA       Past Medical History:   Diagnosis Date    Encounter for insertion of ParaGard IUD 2018    @ [facility], per pt      Past Surgical History:   Procedure Laterality Date    CATARACT EXTRACTION, BILATERAL  2017    VAGINAL DELIVERY       Family History   Problem Relation Age of Onset    No Known Problems Mother     No Known Problems Father     Breast cancer Neg Hx     Colon cancer Neg Hx     Ovarian cancer Neg Hx      Social History     Tobacco Use    Smoking status: Never Smoker    Smokeless tobacco: Never Used   Substance Use Topics    Alcohol use: Yes    Drug use: No       Current Outpatient Medications:     copper intrauterine device (PARAGARD T 380A) 380 square mm IUD,  0, 78.5, Disp: , Rfl:     progesterone (PROMETRIUM) 100 MG capsule, Take 1 capsule by mouth 30-60 minutes before bed every night, Disp: 90 capsule, Rfl: 3    Review of patient's allergies indicates:  No Known Allergies    Review of Systems  Review of Systems   Constitutional: Negative for  "chills, fatigue and fever.   HENT: Negative for trouble swallowing.    Eyes: Negative for visual disturbance.   Respiratory: Negative for cough and shortness of breath.    Cardiovascular: Negative for chest pain.   Gastrointestinal: Negative for abdominal distention, abdominal pain, blood in stool, constipation, diarrhea, nausea and vomiting.   Genitourinary: Negative for difficulty urinating, dyspareunia, dysuria, flank pain, frequency, hematuria, pelvic pain, urgency, vaginal bleeding, vaginal discharge and vaginal pain.   Musculoskeletal: Negative for arthralgias and back pain.   Skin: Negative for rash.   Neurological: Negative for dizziness and headaches.   Psychiatric/Behavioral: Negative for sleep disturbance. The patient is not nervous/anxious.         Objective:     Vitals:    07/26/22 1039   BP: 120/80   Weight: 71.3 kg (157 lb 4.8 oz)   Height: 5' 4" (1.626 m)   PainSc: 0-No pain     Body mass index is 27 kg/m².      Physical Exam:   Constitutional: She is oriented to person, place, and time. Vital signs are normal. She appears well-developed and well-nourished.    HENT:   Head: Normocephalic.     Neck: No thyromegaly present.     Pulmonary/Chest: Right breast exhibits no mass, no nipple discharge, no skin change, no tenderness and no swelling. Left breast exhibits no mass, no nipple discharge, no skin change, no tenderness and no swelling. Breasts are symmetrical.        Abdominal: Soft. Bowel sounds are normal. She exhibits no distension. There is no abdominal tenderness.     Genitourinary:    Vagina and uterus normal.      Pelvic exam was performed with patient supine.   There is no rash, tenderness, lesion or injury on the right labia. There is no rash, tenderness, lesion or injury on the left labia. Cervix is normal. Right adnexum displays no mass, no tenderness and no fullness. Left adnexum displays no mass, no tenderness and no fullness. No erythema or  no vaginal discharge in the vagina. Cervix " exhibits no motion tenderness and no discharge.           Musculoskeletal: Normal range of motion.      Lymphadenopathy:        Right: No supraclavicular adenopathy present.        Left: No supraclavicular adenopathy present.    Neurological: She is alert and oriented to person, place, and time.    Skin: Skin is warm and dry.    Psychiatric: She has a normal mood and affect.        Assessment/ Plan:     Encounter for gynecological examination    PMS (premenstrual syndrome)  -     progesterone (PROMETRIUM) 100 MG capsule; Take 1 capsule by mouth 30-60 minutes before bed every night  Dispense: 90 capsule; Refill: 3      Can try magnesium 200 mg QHS  Will try progesterone 100 mg QHS  Routine pap smears.  Self breast exam  Diet and exercise discussed.  Gardasil discussed.  Contraception reviewed/discussed.  Follow-up with me in 1 year.

## 2022-08-21 ENCOUNTER — PATIENT MESSAGE (OUTPATIENT)
Dept: INTERNAL MEDICINE | Facility: CLINIC | Age: 37
End: 2022-08-21
Payer: COMMERCIAL

## 2022-08-23 ENCOUNTER — OFFICE VISIT (OUTPATIENT)
Dept: PODIATRY | Facility: CLINIC | Age: 37
End: 2022-08-23
Payer: COMMERCIAL

## 2022-08-23 VITALS
SYSTOLIC BLOOD PRESSURE: 119 MMHG | DIASTOLIC BLOOD PRESSURE: 79 MMHG | WEIGHT: 152.69 LBS | HEART RATE: 80 BPM | BODY MASS INDEX: 26.21 KG/M2

## 2022-08-23 DIAGNOSIS — B35.1 ONYCHOMYCOSIS DUE TO DERMATOPHYTE: Primary | ICD-10-CM

## 2022-08-23 DIAGNOSIS — L60.9 DISEASE OF NAIL: ICD-10-CM

## 2022-08-23 PROCEDURE — 3078F DIAST BP <80 MM HG: CPT | Mod: CPTII,S$GLB,, | Performed by: PODIATRIST

## 2022-08-23 PROCEDURE — 99999 PR PBB SHADOW E&M-EST. PATIENT-LVL III: ICD-10-PCS | Mod: PBBFAC,,, | Performed by: PODIATRIST

## 2022-08-23 PROCEDURE — 1159F PR MEDICATION LIST DOCUMENTED IN MEDICAL RECORD: ICD-10-PCS | Mod: CPTII,S$GLB,, | Performed by: PODIATRIST

## 2022-08-23 PROCEDURE — 1159F MED LIST DOCD IN RCRD: CPT | Mod: CPTII,S$GLB,, | Performed by: PODIATRIST

## 2022-08-23 PROCEDURE — 3008F BODY MASS INDEX DOCD: CPT | Mod: CPTII,S$GLB,, | Performed by: PODIATRIST

## 2022-08-23 PROCEDURE — 3078F PR MOST RECENT DIASTOLIC BLOOD PRESSURE < 80 MM HG: ICD-10-PCS | Mod: CPTII,S$GLB,, | Performed by: PODIATRIST

## 2022-08-23 PROCEDURE — 99203 OFFICE O/P NEW LOW 30 MIN: CPT | Mod: S$GLB,,, | Performed by: PODIATRIST

## 2022-08-23 PROCEDURE — 99999 PR PBB SHADOW E&M-EST. PATIENT-LVL III: CPT | Mod: PBBFAC,,, | Performed by: PODIATRIST

## 2022-08-23 PROCEDURE — 3008F PR BODY MASS INDEX (BMI) DOCUMENTED: ICD-10-PCS | Mod: CPTII,S$GLB,, | Performed by: PODIATRIST

## 2022-08-23 PROCEDURE — 99203 PR OFFICE/OUTPT VISIT, NEW, LEVL III, 30-44 MIN: ICD-10-PCS | Mod: S$GLB,,, | Performed by: PODIATRIST

## 2022-08-23 PROCEDURE — 3074F SYST BP LT 130 MM HG: CPT | Mod: CPTII,S$GLB,, | Performed by: PODIATRIST

## 2022-08-23 PROCEDURE — 3074F PR MOST RECENT SYSTOLIC BLOOD PRESSURE < 130 MM HG: ICD-10-PCS | Mod: CPTII,S$GLB,, | Performed by: PODIATRIST

## 2022-08-23 RX ORDER — TERBINAFINE HYDROCHLORIDE 250 MG/1
250 TABLET ORAL DAILY
Qty: 90 TABLET | Refills: 0 | Status: SHIPPED | OUTPATIENT
Start: 2022-08-23 | End: 2022-09-22

## 2022-08-23 RX ORDER — AMMONIUM LACTATE 12 G/100G
1 CREAM TOPICAL 2 TIMES DAILY
Qty: 140 G | Refills: 11 | Status: SHIPPED | OUTPATIENT
Start: 2022-08-23 | End: 2023-10-22

## 2022-08-23 RX ORDER — KETOCONAZOLE 20 MG/G
CREAM TOPICAL DAILY
Qty: 60 G | Refills: 2 | Status: SHIPPED | OUTPATIENT
Start: 2022-08-23 | End: 2024-02-15

## 2022-08-23 NOTE — LETTER
August 23, 2022      [id number]  [address]  [facility] [facility] [address]  Phone: [phone]       Patient: Grecia Carson   YOB: 1985  Date of Visit: 08/23/2022    To Whom It May Concern:    Ailyn Carson  was at [facility] on 08/23/2022. The patient may return to work on 08/23/2022with no restrictions. If you have any questions or concerns, or if I can be of further assistance, please do not hesitate to contact me.    Sincerely,    Dorothea Boykin MA

## 2022-08-31 NOTE — PROGRESS NOTES
Subjective:      Patient ID: Grecia Carson is a 37 y.o. female.    Chief Complaint:   Nail Problem (Nails discoloration )    Grecia is a 37 y.o. female who presents to the clinic complaining of thick and discolored toenails on both feet. Grecia is inquiring about treatment options.    Review of Systems   Constitutional: Negative for chills, decreased appetite, fever and malaise/fatigue.   HENT:  Negative for congestion, hearing loss, nosebleeds and tinnitus.    Eyes:  Negative for double vision, pain, photophobia and visual disturbance.   Cardiovascular:  Negative for chest pain, claudication, cyanosis and leg swelling.   Respiratory:  Negative for cough, hemoptysis, shortness of breath and wheezing.    Endocrine: Negative for cold intolerance and heat intolerance.   Hematologic/Lymphatic: Negative for adenopathy and bleeding problem.   Skin:  Positive for color change and nail changes. Negative for dry skin, itching and suspicious lesions.   Musculoskeletal:  Negative for arthritis, joint pain, myalgias and stiffness.   Gastrointestinal:  Negative for abdominal pain, jaundice, nausea and vomiting.   Genitourinary:  Negative for dysuria, frequency and hematuria.   Neurological:  Negative for difficulty with concentration, loss of balance, numbness, paresthesias and sensory change.   Psychiatric/Behavioral:  Negative for altered mental status, hallucinations and suicidal ideas. The patient is not nervous/anxious.    Allergic/Immunologic: Negative for environmental allergies and persistent infections.         Objective:      Physical Exam  Vitals reviewed.   Constitutional:       Appearance: She is well-developed.   HENT:      Head: Normocephalic and atraumatic.   Cardiovascular:      Pulses:           Dorsalis pedis pulses are 2+ on the right side and 2+ on the left side.        Posterior tibial pulses are 2+ on the right side and 2+ on the left side.   Pulmonary:      Effort: Pulmonary effort is normal.    Musculoskeletal:         General: Normal range of motion.      Comments: Inspection and palpation of the muscles joints and bones of both lower extremities reveal that muscle strength for the anterior lateral and posterior muscle groups and intrinsic muscle groups of the foot are all 5 over 5 symmetrical.  Ankle subtalar midtarsal and digital joint range of motion are within normal limits, nonpainful, without crepitus or effusion.  Patient exhibits a normal angle and base of gait.  Palpation of the tendons reveal no defects.   Skin:     General: Skin is warm and dry.      Capillary Refill: Capillary refill takes 2 to 3 seconds.      Comments: Skin turgor is normal bilaterally.  Skin texture is well hydrated to both lower extremities.  No lesions or rashes or wounds appreciated bilaterally.  Multiple nails demonstrate thickening, discoloration.   Neurological:      Mental Status: She is alert and oriented to person, place, and time.      Comments: Sharp dull light touch vibratory proprioceptive sensation are intact bilaterally.  Deep tendon reflexes to patellar and Achilles tendon are symmetrical 2 over 4 bilaterally.  No ankle clonus or Babinski reflexes noted bilaterally.  Coordination is normal to both feet and lower extremities.   Psychiatric:         Behavior: Behavior normal.           Assessment:       Encounter Diagnoses   Name Primary?    Onychomycosis due to dermatophyte Yes    Disease of nail      Independent visualization of imaging was performed.  Results were reviewed in detail with patient.       Plan:       Grecia was seen today for nail problem.    Diagnoses and all orders for this visit:    Onychomycosis due to dermatophyte    Disease of nail    Other orders  -     ketoconazole (NIZORAL) 2 % cream; Apply topically once daily. Apply to nails  -     ammonium lactate 12 % Crea; Apply 1 application topically 2 (two) times daily.  -     terbinafine HCL (LAMISIL) 250 mg tablet; Take 1 tablet (250 mg  total) by mouth once daily.      I counseled the patient on her conditions, their implications and medical management.    The nature of the condition, options for management, as well as potential risks and complications were discussed in detail with patient. Patient was amenable to my recommendations and left my office fully informed and will follow up as instructed or sooner if necessary.      We discussed using Lamisil for onychomycosis. This drug offers a fairly high but not universal cure rate. A 12 week treatment course is recommended. The patient is aware that rare cases of liver injury have been reported; and agrees to come in for liver function tests at 6 weeks of treatment. The symptoms of liver disease have been discussed; call if such occurs. In addition, some insurance plans do not cover the expense of this drug for treating a cosmetic condition, and the patient understands they may have to pay for the medication. Other side effects, such as headaches and rashes, have also been discussed.    Follow-up in 3 months.

## 2022-11-10 ENCOUNTER — TELEPHONE (OUTPATIENT)
Dept: INTERNAL MEDICINE | Facility: CLINIC | Age: 37
End: 2022-11-10
Payer: COMMERCIAL

## 2022-11-10 NOTE — TELEPHONE ENCOUNTER
----- Message from Peyton Thompson sent at 11/10/2022  7:37 AM CST -----  Contact: [phone] Patient  Pt states she thought her appt today was for a virtual visit. Please call and advise.

## 2022-11-14 ENCOUNTER — OFFICE VISIT (OUTPATIENT)
Dept: INTERNAL MEDICINE | Facility: CLINIC | Age: 37
End: 2022-11-14
Payer: COMMERCIAL

## 2022-11-14 ENCOUNTER — PATIENT MESSAGE (OUTPATIENT)
Dept: INTERNAL MEDICINE | Facility: CLINIC | Age: 37
End: 2022-11-14

## 2022-11-14 DIAGNOSIS — F32.1 CURRENT MODERATE EPISODE OF MAJOR DEPRESSIVE DISORDER WITHOUT PRIOR EPISODE: Primary | ICD-10-CM

## 2022-11-14 PROCEDURE — 99214 OFFICE O/P EST MOD 30 MIN: CPT | Mod: 95,,, | Performed by: INTERNAL MEDICINE

## 2022-11-14 PROCEDURE — 99214 PR OFFICE/OUTPT VISIT, EST, LEVL IV, 30-39 MIN: ICD-10-PCS | Mod: 95,,, | Performed by: INTERNAL MEDICINE

## 2022-11-14 RX ORDER — FLUOXETINE 10 MG/1
10 CAPSULE ORAL DAILY
Qty: 30 CAPSULE | Refills: 11 | Status: SHIPPED | OUTPATIENT
Start: 2022-11-14 | End: 2024-02-15

## 2022-11-14 NOTE — PROGRESS NOTES
[facility]  Internal Medicine Clinic Note  The patient location is: [place]  The chief complaint leading to consultation is: depression     Visit type: audiovisual    Face to Face time with patient: 10  10 minutes of total time spent on the encounter, which includes face to face time and non-face to face time preparing to see the patient (eg, review of tests), Obtaining and/or reviewing separately obtained history, Documenting clinical information in the electronic or other health record, Independently interpreting results (not separately reported) and communicating results to the patient/family/caregiver, or Care coordination (not separately reported).         Each patient to whom he or she provides medical services by telemedicine is:  (1) informed of the relationship between the physician and patient and the respective role of any other health care provider with respect to management of the patient; and (2) notified that he or she may decline to receive medical services by telemedicine and may withdraw from such care at any time.    Notes:     Chief Complaint    No chief complaint on file.    History of Present Illness      Grecia Carson is a 37 y.o. female who presents today for chief complaint depression . Patient previously seen by me    PCP: Bella Thomas MD  Patient comes to appointment telemed.     HPI   Feeling depressed, having bd days, feeling bad most days, irritable, having bad thoughts, sense of worry or dread. Her sleep is ok. Affects her day to day life.   Has never taken a medication before ,she is ready to start something    Active Problem List with Overview Notes    Diagnosis Date Noted    Fibrocystic breast 05/31/2022       Health Maintenance   Topic Date Due    TETANUS VACCINE  02/28/2024    Hepatitis C Screening  Completed    Lipid Panel  Completed       Past Medical History:   Diagnosis Date    Encounter for insertion of ParaGard IUD 2018    @ [facility], per pt        Past Surgical History:   Procedure  Laterality Date    CATARACT EXTRACTION, BILATERAL  2017    VAGINAL DELIVERY  2014       family history includes No Known Problems in her father and mother.    Social History     Tobacco Use    Smoking status: Never    Smokeless tobacco: Never   Substance Use Topics    Alcohol use: Yes    Drug use: No       ROS     Outpatient Encounter Medications as of 11/14/2022   Medication Sig Note Dispense Refill    ammonium lactate 12 % Crea Apply 1 application topically 2 (two) times daily.  140 g 11    copper intrauterine device (PARAGARD T 380A) 380 square mm IUD   0, 78.5 7/26/2022: Inserted  2018 @ [facility], per pt       FLUoxetine 10 MG capsule Take 1 capsule (10 mg total) by mouth once daily.  30 capsule 11    ketoconazole (NIZORAL) 2 % cream Apply topically once daily. Apply to nails  60 g 2    progesterone (PROMETRIUM) 100 MG capsule Take 1 capsule by mouth 30-60 minutes before bed every night  90 capsule 3     No facility-administered encounter medications on file as of 11/14/2022.        Review of patient's allergies indicates:  No Known Allergies        Physical Exam       ]    Physical Exam     Laboratory:  CBC:  No results for input(s): WBC, RBC, HGB, HCT, PLT, MCV, MCH, MCHC in the last 2160 hours.  CMP:  No results for input(s): GLU, CALCIUM, ALBUMIN, PROT, NA, K, CO2, CL, BUN, ALKPHOS, ALT, AST, BILITOT in the last 2160 hours.    Invalid input(s): CREATININ  URINALYSIS:  No results for input(s): COLORU, CLARITYU, SPECGRAV, PHUR, PROTEINUA, GLUCOSEU, BILIRUBINCON, BLOODU, WBCU, RBCU, BACTERIA, MUCUS, NITRITE, LEUKOCYTESUR, UROBILINOGEN, HYALINECASTS in the last 2160 hours.   LIPIDS:  No results for input(s): TSH, HDL, CHOL, TRIG, LDLCALC, CHOLHDL, NONHDLCHOL, TOTALCHOLEST in the last 2160 hours.  TSH:  No results for input(s): TSH in the last 2160 hours.  A1C:  No results for input(s): HGBA1C in the last 2160 hours.    Radiology:      Assessment/Plan     Grecia Carson is a 37 y.o.female with:    1. Current moderate  episode of major depressive disorder without prior episode  -     FLUoxetine 10 MG capsule; Take 1 capsule (10 mg total) by mouth once daily.  Dispense: 30 capsule; Refill: 11    Start on fluoxetine  Counseled on black box warning and possible side effects, patient verbalized understanding  Will schedule 6 week follow up but counseled to contact me in the meantime for questions or concerns      Use of the [facility] Patient Portal discussed and encouraged during today's visit  -Continue current medications and maintain follow up with specialists.  Return to clinic in .  No future appointments.    Bella Thomas MD  11/14/2022 7:38 AM    Primary Care Internal Medicine

## 2023-02-13 ENCOUNTER — TELEPHONE (OUTPATIENT)
Dept: DERMATOLOGY | Facility: CLINIC | Age: 38
End: 2023-02-13
Payer: COMMERCIAL

## 2023-02-13 NOTE — TELEPHONE ENCOUNTER
Informed pt next available appt is in June. Pt stated she'll try to schedule an appt with another facility.    RD        ----- Message from Jeana Matos MA sent at 2/13/2023  2:42 PM CST -----  Regarding: appt  Contact: pt [phone]  Patient is calling to get an appt  for [facility] offered tomorrow but she is not able to make it  [facility] is not willing to [facility] is asking that she can get a call if there is a cancellation. Please call pt  [phone]

## 2023-02-15 NOTE — PROGRESS NOTES
SUBJECTIVE:     Chief Complaint & History of Present Illness:  Grecia Carson is a 38yo  RHD female who presents today with c/o RIGHT ARM pain.  The pain began gradually and atraumatically approximately 3 months ago.  It is intermittent and dull in nature.  The pain is localized to volar forearm musculature as well as a band around the wrist.  She denies any changes in strength or sensation.  She is not having any difficulties with range of motion to the elbow, hand or wrist.  She is currently not taking any medication for pain.  Patient otherwise denies additional concerns or complaints.    Review of patient's allergies indicates:  No Known Allergies      Current Outpatient Medications   Medication Sig Dispense Refill    ammonium lactate 12 % Crea Apply 1 application topically 2 (two) times daily. 140 g 11    copper intrauterine device (PARAGARD T 380A) 380 square mm IUD   0, 78.5      FLUoxetine 10 MG capsule Take 1 capsule (10 mg total) by mouth once daily. 30 capsule 11    ketoconazole (NIZORAL) 2 % cream Apply topically once daily. Apply to nails 60 g 2    progesterone (PROMETRIUM) 100 MG capsule Take 1 capsule by mouth 30-60 minutes before bed every night 90 capsule 3     No current facility-administered medications for this visit.       Past Medical History:   Diagnosis Date    Encounter for insertion of ParaGard IUD 2018    @ [facility], per pt        Past Surgical History:   Procedure Laterality Date    CATARACT EXTRACTION, BILATERAL  2017    VAGINAL DELIVERY  2014       Vital Signs (Most Recent)  There were no vitals filed for this visit.    Review of Systems:  Review of Systems   Constitutional:  Negative for chills, fever and weight loss.   HENT:  Negative for congestion, ear discharge and nosebleeds.    Eyes:  Negative for blurred vision, pain and discharge.   Respiratory:  Negative for cough, sputum production and shortness of breath.    Cardiovascular:  Negative for chest pain and leg swelling.    Gastrointestinal:  Negative for nausea and vomiting.   Genitourinary:  Negative for dysuria.   Musculoskeletal:  Positive for joint pain. Negative for falls.   Skin:  Negative for rash.   Neurological:  Negative for dizziness, tingling, sensory change and weakness.        OBJECTIVE:     PHYSICAL EXAM:       Ortho/SPM Exam  RIGHT ARM/WRIST:   Skin intact throughout arm without rashes, lacerations, lesions.   Swelling negative throughout.   NTTP medial or lateral epicondyle conjoined tendons, radial head, olecranon, or additional bony prominences/soft tissues throughout.   ROM complete to elbow flexion/extension/supination and pronation without pain or difficulty.  ROM complete to hand and wrist without pain or difficulty.      Negative pain to resisted wrist flexion and extension.  Sensation intact to radial/median/ulnar nerve distribution. Tinels negative. Pulses palpable to radial bilaterally. Cap refill brisk.      RADIOGRAPHS:  Three views of bilateral elbows obtained in the orthopedic clinic today, and independently reviewed, shows:  No acute fractures or dislocations.  Joint spaces are open and preserved.  No foreign bodies.    ASSESSMENT/PLAN:     1. Carpal tunnel syndrome on right        2. Medial epicondylitis of elbow, right           Physical exam today was largely unremarkable for any advanced pathology to the elbow hand or wrist.    Carpal tunnel, right:  We discussed conservative treatment for the patient's right wrist pain likely secondary to early carpal tunnel syndrome.  DME:  Wrist cock-up brace for nighttime use  Follow-up:  6 weeks  Consider EMG and/or CSI carpal tunnel should symptoms continue or worsen    Medial epicondylitis:  The patient is elbow pain and forearm musculature pain is likely secondary to soft tissue overuse and inflammation.  She continues to have excellent range of motion, strength, and lacks any pinpoint tenderness today.  Pain control: Meloxicam for anti  inflammation  Activity:  We discussed aggravating factors to golfer's elbow    Follow Up: 6wks or PRN

## 2023-02-17 DIAGNOSIS — M25.521 RIGHT ELBOW PAIN: Primary | ICD-10-CM

## 2023-02-20 ENCOUNTER — HOSPITAL ENCOUNTER (OUTPATIENT)
Dept: RADIOLOGY | Facility: HOSPITAL | Age: 38
Discharge: HOME | End: 2023-02-20
Attending: PHYSICIAN ASSISTANT
Payer: COMMERCIAL

## 2023-02-20 ENCOUNTER — OFFICE VISIT (OUTPATIENT)
Dept: ORTHOPEDICS | Facility: CLINIC | Age: 38
End: 2023-02-20
Payer: COMMERCIAL

## 2023-02-20 VITALS — WEIGHT: 184 LBS | BODY MASS INDEX: 31.41 KG/M2 | HEIGHT: 64 IN

## 2023-02-20 DIAGNOSIS — M25.521 RIGHT ELBOW PAIN: ICD-10-CM

## 2023-02-20 DIAGNOSIS — M77.01 MEDIAL EPICONDYLITIS OF ELBOW, RIGHT: ICD-10-CM

## 2023-02-20 DIAGNOSIS — G56.01 CARPAL TUNNEL SYNDROME ON RIGHT: Primary | ICD-10-CM

## 2023-02-20 PROCEDURE — 1160F RVW MEDS BY RX/DR IN RCRD: CPT | Mod: CPTII,S$GLB,, | Performed by: PHYSICIAN ASSISTANT

## 2023-02-20 PROCEDURE — 99999 PR PBB SHADOW E&M-EST. PATIENT-LVL III: CPT | Mod: PBBFAC,,, | Performed by: PHYSICIAN ASSISTANT

## 2023-02-20 PROCEDURE — 1159F MED LIST DOCD IN RCRD: CPT | Mod: CPTII,S$GLB,, | Performed by: PHYSICIAN ASSISTANT

## 2023-02-20 PROCEDURE — 1159F PR MEDICATION LIST DOCUMENTED IN MEDICAL RECORD: ICD-10-PCS | Mod: CPTII,S$GLB,, | Performed by: PHYSICIAN ASSISTANT

## 2023-02-20 PROCEDURE — 99999 PR PBB SHADOW E&M-EST. PATIENT-LVL III: ICD-10-PCS | Mod: PBBFAC,,, | Performed by: PHYSICIAN ASSISTANT

## 2023-02-20 PROCEDURE — 99203 PR OFFICE/OUTPT VISIT, NEW, LEVL III, 30-44 MIN: ICD-10-PCS | Mod: S$GLB,,, | Performed by: PHYSICIAN ASSISTANT

## 2023-02-20 PROCEDURE — 73080 X-RAY EXAM OF ELBOW: CPT | Mod: TC,50,PN

## 2023-02-20 PROCEDURE — 73080 X-RAY EXAM OF ELBOW: CPT | Mod: 26,50,, | Performed by: RADIOLOGY

## 2023-02-20 PROCEDURE — 99203 OFFICE O/P NEW LOW 30 MIN: CPT | Mod: S$GLB,,, | Performed by: PHYSICIAN ASSISTANT

## 2023-02-20 PROCEDURE — 3008F PR BODY MASS INDEX (BMI) DOCUMENTED: ICD-10-PCS | Mod: CPTII,S$GLB,, | Performed by: PHYSICIAN ASSISTANT

## 2023-02-20 PROCEDURE — 1160F PR REVIEW ALL MEDS BY PRESCRIBER/CLIN PHARMACIST DOCUMENTED: ICD-10-PCS | Mod: CPTII,S$GLB,, | Performed by: PHYSICIAN ASSISTANT

## 2023-02-20 PROCEDURE — 3008F BODY MASS INDEX DOCD: CPT | Mod: CPTII,S$GLB,, | Performed by: PHYSICIAN ASSISTANT

## 2023-02-20 PROCEDURE — 73080 XR ELBOW COMPLETE 3 VIEW BILATERAL: ICD-10-PCS | Mod: 26,50,, | Performed by: RADIOLOGY

## 2023-02-20 RX ORDER — MELOXICAM 15 MG/1
15 TABLET ORAL DAILY
Qty: 30 TABLET | Refills: 1 | Status: SHIPPED | OUTPATIENT
Start: 2023-02-20 | End: 2023-03-20

## 2023-05-16 ENCOUNTER — OFFICE VISIT (OUTPATIENT)
Dept: PODIATRY | Facility: CLINIC | Age: 38
End: 2023-05-16
Payer: COMMERCIAL

## 2023-05-16 ENCOUNTER — TELEPHONE (OUTPATIENT)
Dept: PODIATRY | Facility: CLINIC | Age: 38
End: 2023-05-16
Payer: COMMERCIAL

## 2023-05-16 VITALS
SYSTOLIC BLOOD PRESSURE: 111 MMHG | HEART RATE: 62 BPM | DIASTOLIC BLOOD PRESSURE: 72 MMHG | BODY MASS INDEX: 26.54 KG/M2 | HEIGHT: 64 IN | WEIGHT: 155.44 LBS

## 2023-05-16 DIAGNOSIS — L60.9 DISEASE OF NAIL: ICD-10-CM

## 2023-05-16 DIAGNOSIS — B35.1 ONYCHOMYCOSIS DUE TO DERMATOPHYTE: Primary | ICD-10-CM

## 2023-05-16 PROCEDURE — 3008F PR BODY MASS INDEX (BMI) DOCUMENTED: ICD-10-PCS | Mod: CPTII,S$GLB,, | Performed by: PODIATRIST

## 2023-05-16 PROCEDURE — 1159F MED LIST DOCD IN RCRD: CPT | Mod: CPTII,S$GLB,, | Performed by: PODIATRIST

## 2023-05-16 PROCEDURE — 99999 PR PBB SHADOW E&M-EST. PATIENT-LVL III: CPT | Mod: PBBFAC,,, | Performed by: PODIATRIST

## 2023-05-16 PROCEDURE — 3074F PR MOST RECENT SYSTOLIC BLOOD PRESSURE < 130 MM HG: ICD-10-PCS | Mod: CPTII,S$GLB,, | Performed by: PODIATRIST

## 2023-05-16 PROCEDURE — 3008F BODY MASS INDEX DOCD: CPT | Mod: CPTII,S$GLB,, | Performed by: PODIATRIST

## 2023-05-16 PROCEDURE — 99213 OFFICE O/P EST LOW 20 MIN: CPT | Mod: S$GLB,,, | Performed by: PODIATRIST

## 2023-05-16 PROCEDURE — 3078F PR MOST RECENT DIASTOLIC BLOOD PRESSURE < 80 MM HG: ICD-10-PCS | Mod: CPTII,S$GLB,, | Performed by: PODIATRIST

## 2023-05-16 PROCEDURE — 3074F SYST BP LT 130 MM HG: CPT | Mod: CPTII,S$GLB,, | Performed by: PODIATRIST

## 2023-05-16 PROCEDURE — 99213 PR OFFICE/OUTPT VISIT, EST, LEVL III, 20-29 MIN: ICD-10-PCS | Mod: S$GLB,,, | Performed by: PODIATRIST

## 2023-05-16 PROCEDURE — 1159F PR MEDICATION LIST DOCUMENTED IN MEDICAL RECORD: ICD-10-PCS | Mod: CPTII,S$GLB,, | Performed by: PODIATRIST

## 2023-05-16 PROCEDURE — 99999 PR PBB SHADOW E&M-EST. PATIENT-LVL III: ICD-10-PCS | Mod: PBBFAC,,, | Performed by: PODIATRIST

## 2023-05-16 PROCEDURE — 3078F DIAST BP <80 MM HG: CPT | Mod: CPTII,S$GLB,, | Performed by: PODIATRIST

## 2023-05-16 RX ORDER — TERBINAFINE HYDROCHLORIDE 250 MG/1
250 TABLET ORAL DAILY
Qty: 30 TABLET | Refills: 0 | Status: SHIPPED | OUTPATIENT
Start: 2023-05-16 | End: 2023-06-15

## 2023-05-16 RX ORDER — CICLOPIROX 80 MG/ML
SOLUTION TOPICAL NIGHTLY
Qty: 6.6 ML | Refills: 6 | Status: SHIPPED | OUTPATIENT
Start: 2023-05-16 | End: 2024-02-29

## 2023-05-16 NOTE — TELEPHONE ENCOUNTER
Went to schedule pt after Dr. Reyes saw the pt. Pt was to be scheduled to f/u in 4 months. Pt stated that she prefers to call for her f/u. Pt was offered to be scheduled and made aware that she could change her appt in the portal. Pt denied and left.

## 2023-05-21 NOTE — PROGRESS NOTES
Subjective:      Patient ID: Grecia Carson is a 37 y.o. female.    Chief Complaint:   Nail Problem (Toenail fungus bilateral )    Grecia is a 37 y.o. female who presents to the clinic complaining of thick and discolored toenails on both feet. Grecia is inquiring about treatment options.  Improved with still having issues.    Review of Systems   Constitutional: Negative for chills, decreased appetite, fever and malaise/fatigue.   HENT:  Negative for congestion, hearing loss, nosebleeds and tinnitus.    Eyes:  Negative for double vision, pain, photophobia and visual disturbance.   Cardiovascular:  Negative for chest pain, claudication, cyanosis and leg swelling.   Respiratory:  Negative for cough, hemoptysis, shortness of breath and wheezing.    Endocrine: Negative for cold intolerance and heat intolerance.   Hematologic/Lymphatic: Negative for adenopathy and bleeding problem.   Skin:  Positive for color change and nail changes. Negative for dry skin, itching and suspicious lesions.   Musculoskeletal:  Negative for arthritis, joint pain, myalgias and stiffness.   Gastrointestinal:  Negative for abdominal pain, jaundice, nausea and vomiting.   Genitourinary:  Negative for dysuria, frequency and hematuria.   Neurological:  Negative for difficulty with concentration, loss of balance, numbness, paresthesias and sensory change.   Psychiatric/Behavioral:  Negative for altered mental status, hallucinations and suicidal ideas. The patient is not nervous/anxious.    Allergic/Immunologic: Negative for environmental allergies and persistent infections.         Objective:      Physical Exam  Vitals reviewed.   Constitutional:       Appearance: She is well-developed.   HENT:      Head: Normocephalic and atraumatic.   Cardiovascular:      Pulses:           Dorsalis pedis pulses are 2+ on the right side and 2+ on the left side.        Posterior tibial pulses are 2+ on the right side and 2+ on the left side.   Pulmonary:      Effort:  Pulmonary effort is normal.   Musculoskeletal:         General: Normal range of motion.      Comments: Inspection and palpation of the muscles joints and bones of both lower extremities reveal that muscle strength for the anterior lateral and posterior muscle groups and intrinsic muscle groups of the foot are all 5 over 5 symmetrical.  Ankle subtalar midtarsal and digital joint range of motion are within normal limits, nonpainful, without crepitus or effusion.  Patient exhibits a normal angle and base of gait.  Palpation of the tendons reveal no defects.   Skin:     General: Skin is warm and dry.      Capillary Refill: Capillary refill takes 2 to 3 seconds.      Comments: Skin turgor is normal bilaterally.  Skin texture is well hydrated to both lower extremities.  No lesions or rashes or wounds appreciated bilaterally.  Multiple nails demonstrate thickening, discoloration.   Neurological:      Mental Status: She is alert and oriented to person, place, and time.      Comments: Sharp dull light touch vibratory proprioceptive sensation are intact bilaterally.  Deep tendon reflexes to patellar and Achilles tendon are symmetrical 2 over 4 bilaterally.  No ankle clonus or Babinski reflexes noted bilaterally.  Coordination is normal to both feet and lower extremities.   Psychiatric:         Behavior: Behavior normal.           Assessment:       Encounter Diagnoses   Name Primary?    Onychomycosis due to dermatophyte Yes    Disease of nail      Independent visualization of imaging was performed.  Results were reviewed in detail with patient.       Plan:       Grecia was seen today for nail problem.    Diagnoses and all orders for this visit:    Onychomycosis due to dermatophyte    Disease of nail    Other orders  -     ciclopirox (PENLAC) 8 % Soln; Apply topically nightly.  -     terbinafine HCL (LAMISIL) 250 mg tablet; Take 1 tablet (250 mg total) by mouth once daily.      I counseled the patient on her conditions, their  implications and medical management.    The nature of the condition, options for management, as well as potential risks and complications were discussed in detail with patient. Patient was amenable to my recommendations and left my office fully informed and will follow up as instructed or sooner if necessary.      We discussed using Lamisil for onychomycosis. This drug offers a fairly high but not universal cure rate. A 12 week treatment course is recommended. The patient is aware that rare cases of liver injury have been reported; and agrees to come in for liver function tests at 6 weeks of treatment. The symptoms of liver disease have been discussed; call if such occurs. In addition, some insurance plans do not cover the expense of this drug for treating a cosmetic condition, and the patient understands they may have to pay for the medication. Other side effects, such as headaches and rashes, have also been discussed.    Follow-up in 3 months.

## 2023-10-19 ENCOUNTER — PATIENT MESSAGE (OUTPATIENT)
Dept: PODIATRY | Facility: CLINIC | Age: 38
End: 2023-10-19
Payer: COMMERCIAL

## 2023-10-22 RX ORDER — AMMONIUM LACTATE 12 G/100G
CREAM TOPICAL
Qty: 140 G | Refills: 11 | Status: SHIPPED | OUTPATIENT
Start: 2023-10-22 | End: 2024-02-15

## 2024-02-01 ENCOUNTER — TELEPHONE (OUTPATIENT)
Dept: PRIMARY CARE CLINIC | Facility: CLINIC | Age: 39
End: 2024-02-01
Payer: COMMERCIAL

## 2024-02-01 DIAGNOSIS — Z00.00 ROUTINE MEDICAL EXAM: Primary | ICD-10-CM

## 2024-02-01 NOTE — TELEPHONE ENCOUNTER
----- Message from Hailey Sylvester sent at 2/1/2024  3:29 PM CST -----  Contact: [phone] Patient  type: Lab    Caller is requesting to schedule their Lab appointment prior to annual appointment.  Order is not listed in EPIC.  Please enter order and contact patient to schedule.        Preferred Date and Time of Labs:02/08/2024 Morning 8:30 AM    Date of Annual Physical Appointment:02/15/2024    Where would they like the lab performed?[facility]    Would the patient rather a call back or a response via [facility]? Call Back Please

## 2024-02-08 ENCOUNTER — LAB VISIT (OUTPATIENT)
Dept: LAB | Facility: HOSPITAL | Age: 39
End: 2024-02-08
Attending: INTERNAL MEDICINE
Payer: COMMERCIAL

## 2024-02-08 DIAGNOSIS — Z00.00 ROUTINE MEDICAL EXAM: ICD-10-CM

## 2024-02-08 LAB
ALBUMIN SERPL BCP-MCNC: 4.1 G/DL (ref 3.5–5.2)
ALP SERPL-CCNC: 36 U/L (ref 55–135)
ALT SERPL W/O P-5'-P-CCNC: 23 U/L (ref 10–44)
ANION GAP SERPL CALC-SCNC: 16 MMOL/L (ref 8–16)
AST SERPL-CCNC: 29 U/L (ref 10–40)
BASOPHILS # BLD AUTO: 0.02 K/UL (ref 0–0.2)
BASOPHILS NFR BLD: 0.6 % (ref 0–1.9)
BILIRUB SERPL-MCNC: 0.7 MG/DL (ref 0.1–1)
BUN SERPL-MCNC: 10 MG/DL (ref 6–20)
CALCIUM SERPL-MCNC: 9.7 MG/DL (ref 8.7–10.5)
CHLORIDE SERPL-SCNC: 105 MMOL/L (ref 95–110)
CHOLEST SERPL-MCNC: 292 MG/DL (ref 120–199)
CHOLEST/HDLC SERPL: 6.1 {RATIO} (ref 2–5)
CO2 SERPL-SCNC: 20 MMOL/L (ref 23–29)
CREAT SERPL-MCNC: 0.7 MG/DL (ref 0.5–1.4)
DIFFERENTIAL METHOD BLD: ABNORMAL
EOSINOPHIL # BLD AUTO: 0 K/UL (ref 0–0.5)
EOSINOPHIL NFR BLD: 0.9 % (ref 0–8)
ERYTHROCYTE [DISTWIDTH] IN BLOOD BY AUTOMATED COUNT: 13.6 % (ref 11.5–14.5)
EST. GFR  (NO RACE VARIABLE): >60 ML/MIN/1.73 M^2
GLUCOSE SERPL-MCNC: 60 MG/DL (ref 70–110)
HCT VFR BLD AUTO: 40.3 % (ref 37–48.5)
HDLC SERPL-MCNC: 48 MG/DL (ref 40–75)
HDLC SERPL: 16.4 % (ref 20–50)
HGB BLD-MCNC: 13.3 G/DL (ref 12–16)
IMM GRANULOCYTES # BLD AUTO: 0.01 K/UL (ref 0–0.04)
IMM GRANULOCYTES NFR BLD AUTO: 0.3 % (ref 0–0.5)
LDLC SERPL CALC-MCNC: 216.2 MG/DL (ref 63–159)
LYMPHOCYTES # BLD AUTO: 1 K/UL (ref 1–4.8)
LYMPHOCYTES NFR BLD: 29.9 % (ref 18–48)
MCH RBC QN AUTO: 32.1 PG (ref 27–31)
MCHC RBC AUTO-ENTMCNC: 33 G/DL (ref 32–36)
MCV RBC AUTO: 97 FL (ref 82–98)
MONOCYTES # BLD AUTO: 0.3 K/UL (ref 0.3–1)
MONOCYTES NFR BLD: 9.4 % (ref 4–15)
NEUTROPHILS # BLD AUTO: 2 K/UL (ref 1.8–7.7)
NEUTROPHILS NFR BLD: 58.9 % (ref 38–73)
NONHDLC SERPL-MCNC: 244 MG/DL
NRBC BLD-RTO: 0 /100 WBC
PLATELET # BLD AUTO: 227 K/UL (ref 150–450)
PMV BLD AUTO: 11.1 FL (ref 9.2–12.9)
POTASSIUM SERPL-SCNC: 4.8 MMOL/L (ref 3.5–5.1)
PROT SERPL-MCNC: 6.6 G/DL (ref 6–8.4)
RBC # BLD AUTO: 4.14 M/UL (ref 4–5.4)
SODIUM SERPL-SCNC: 141 MMOL/L (ref 136–145)
TRIGL SERPL-MCNC: 139 MG/DL (ref 30–150)
TSH SERPL DL<=0.005 MIU/L-ACNC: 2.03 UIU/ML (ref 0.4–4)
WBC # BLD AUTO: 3.31 K/UL (ref 3.9–12.7)

## 2024-02-08 PROCEDURE — 84443 ASSAY THYROID STIM HORMONE: CPT | Performed by: INTERNAL MEDICINE

## 2024-02-08 PROCEDURE — 80061 LIPID PANEL: CPT | Performed by: INTERNAL MEDICINE

## 2024-02-08 PROCEDURE — 85025 COMPLETE CBC W/AUTO DIFF WBC: CPT | Performed by: INTERNAL MEDICINE

## 2024-02-08 PROCEDURE — 80053 COMPREHEN METABOLIC PANEL: CPT | Performed by: INTERNAL MEDICINE

## 2024-02-08 PROCEDURE — 36415 COLL VENOUS BLD VENIPUNCTURE: CPT | Performed by: INTERNAL MEDICINE

## 2024-02-15 ENCOUNTER — LAB VISIT (OUTPATIENT)
Dept: LAB | Facility: HOSPITAL | Age: 39
End: 2024-02-15
Attending: INTERNAL MEDICINE
Payer: COMMERCIAL

## 2024-02-15 ENCOUNTER — OFFICE VISIT (OUTPATIENT)
Dept: PRIMARY CARE CLINIC | Facility: CLINIC | Age: 39
End: 2024-02-15
Payer: COMMERCIAL

## 2024-02-15 VITALS
BODY MASS INDEX: 27.48 KG/M2 | HEART RATE: 71 BPM | DIASTOLIC BLOOD PRESSURE: 74 MMHG | SYSTOLIC BLOOD PRESSURE: 112 MMHG | OXYGEN SATURATION: 99 % | WEIGHT: 160.94 LBS | HEIGHT: 64 IN

## 2024-02-15 DIAGNOSIS — E53.8 FOLIC ACID DEFICIENCY (NON ANEMIC): ICD-10-CM

## 2024-02-15 DIAGNOSIS — F41.1 GAD (GENERALIZED ANXIETY DISORDER): ICD-10-CM

## 2024-02-15 DIAGNOSIS — Z00.00 WELLNESS EXAMINATION: Primary | ICD-10-CM

## 2024-02-15 DIAGNOSIS — F32.1 CURRENT MODERATE EPISODE OF MAJOR DEPRESSIVE DISORDER WITHOUT PRIOR EPISODE: ICD-10-CM

## 2024-02-15 LAB — FOLATE SERPL-MCNC: 15.6 NG/ML (ref 4–24)

## 2024-02-15 PROCEDURE — 82746 ASSAY OF FOLIC ACID SERUM: CPT | Performed by: INTERNAL MEDICINE

## 2024-02-15 PROCEDURE — 1159F MED LIST DOCD IN RCRD: CPT | Mod: CPTII,S$GLB,, | Performed by: INTERNAL MEDICINE

## 2024-02-15 PROCEDURE — 3074F SYST BP LT 130 MM HG: CPT | Mod: CPTII,S$GLB,, | Performed by: INTERNAL MEDICINE

## 2024-02-15 PROCEDURE — 99395 PREV VISIT EST AGE 18-39: CPT | Mod: S$GLB,,, | Performed by: INTERNAL MEDICINE

## 2024-02-15 PROCEDURE — 3008F BODY MASS INDEX DOCD: CPT | Mod: CPTII,S$GLB,, | Performed by: INTERNAL MEDICINE

## 2024-02-15 PROCEDURE — 3078F DIAST BP <80 MM HG: CPT | Mod: CPTII,S$GLB,, | Performed by: INTERNAL MEDICINE

## 2024-02-15 PROCEDURE — 99999 PR PBB SHADOW E&M-EST. PATIENT-LVL III: CPT | Mod: PBBFAC,,, | Performed by: INTERNAL MEDICINE

## 2024-02-15 PROCEDURE — 36415 COLL VENOUS BLD VENIPUNCTURE: CPT | Performed by: INTERNAL MEDICINE

## 2024-02-15 RX ORDER — SERTRALINE HYDROCHLORIDE 50 MG/1
50 TABLET, FILM COATED ORAL DAILY
Qty: 90 TABLET | Refills: 3 | Status: SHIPPED | OUTPATIENT
Start: 2024-02-15 | End: 2024-03-26

## 2024-02-15 RX ORDER — PNV,CALCIUM 72/IRON/FOLIC ACID 27 MG-1 MG
1 TABLET ORAL DAILY
COMMUNITY

## 2024-02-15 NOTE — PROGRESS NOTES
[facility] Note    Chief Complaint      Chief Complaint   Patient presents with    Annual Exam     History of Present Illness      Grecia Carson is a 38 y.o. female who presents today for chief complaint annual wellness .     HPI annual  Prononces her name Galilea   Feels well no complaints   Labs 2.24 ldl 216, Prev 127, she is on keto for weight loss currently, wants to transition to low carb, was 136# in  and was 160 today , mood is up and down   Considering becoming pregnant   Pap: 5.21 Fara, now seeing Dr MILLS [facility] PAP 8.23   Never smoker   I personally reviewed all past medical, surgical, social and family history.  Dad  not due to medical issues, mom is healthy      Mood is ok, sleep is ok, watches diet, exercises   Job is active is a  in [employer] from [employer]  Has 8 year old daughter   Active Problem List with Overview Notes    Diagnosis Date Noted    Current moderate episode of major depressive disorder without prior episode 02/15/2024    Fibrocystic breast 2022       Health Maintenance   Topic Date Due    TETANUS VACCINE  2024    Hepatitis C Screening  Completed    Lipid Panel  Completed       Past Medical History:   Diagnosis Date    Encounter for insertion of ParaGard IUD 2018    @ [facility], per pt        Past Surgical History:   Procedure Laterality Date    CATARACT EXTRACTION, BILATERAL  2017    EYE SURGERY  2018    Cataract    VAGINAL DELIVERY         family history includes No Known Problems in her father and mother.    Social History     Tobacco Use    Smoking status: Never    Smokeless tobacco: Never   Substance Use Topics    Alcohol use: Not Currently     Alcohol/week: 1.0 standard drink of alcohol     Types: 1 Cans of beer per week    Drug use: No       Review of Systems   Constitutional:  Negative for chills, fever, malaise/fatigue and weight loss.   Respiratory:  Negative for cough, sputum production, shortness of breath and  "wheezing.    Cardiovascular:  Negative for chest pain, palpitations, orthopnea and leg swelling.   Gastrointestinal:  Negative for constipation, diarrhea, nausea and vomiting.   Genitourinary:  Negative for dysuria, frequency, hematuria and urgency.        Outpatient Encounter Medications as of 2/15/2024   Medication Sig Note Dispense Refill    ciclopirox (PENLAC) 8 % Soln Apply topically nightly.  6.6 mL 6    WESTAB PLUS 27 mg iron- 1 mg Tab Take 1 tablet by mouth once daily.       sertraline (ZOLOFT) 50 MG tablet Take 1 tablet (50 mg total) by mouth once daily.  90 tablet 3    [DISCONTINUED] ammonium lactate 12 % Crea APPLY EXTERNALLY TO THE AFFECTED AREA( CALLUS) TWICE DAILY  140 g 11    [DISCONTINUED] ammonium lactate 12 % Crea APPLY EXTERNALLY TO THE AFFECTED AREA( CALLUS) TWICE DAILY  140 g 11    [DISCONTINUED] copper intrauterine device (PARAGARD T 380A) 380 square mm IUD   0, 78.5 7/26/2022: Inserted  2018 @ [facility], per pt       [DISCONTINUED] FLUoxetine 10 MG capsule Take 1 capsule (10 mg total) by mouth once daily.  30 capsule 11    [DISCONTINUED] ketoconazole (NIZORAL) 2 % cream Apply topically once daily. Apply to nails (Patient not taking: Reported on 5/16/2023)  60 g 2    [DISCONTINUED] meloxicam (MOBIC) 15 MG tablet TAKE 1 TABLET BY MOUTH DAILY (Patient not taking: Reported on 5/16/2023)  90 tablet 0    [DISCONTINUED] progesterone (PROMETRIUM) 100 MG capsule Take 1 capsule by mouth 30-60 minutes before bed every night  90 capsule 3     No facility-administered encounter medications on file as of 2/15/2024.        Review of patient's allergies indicates:  No Known Allergies        Physical Exam      Vital Signs  Pulse: 71  SpO2: 99 %  BP: 112/74  BP Location: Right arm  Patient Position: Sitting  Height and Weight  Height: 5' 4" (162.6 cm)  Weight: 73 kg (160 lb 15 oz)  BSA (Calculated - sq m): 1.82 sq meters  BMI (Calculated): 27.6  Weight in (lb) to have BMI = 25: 145.3]    Physical Exam  Vitals " "reviewed.   Constitutional:       General: She is not in acute distress.     Appearance: She is well-developed. She is not diaphoretic.   HENT:      Head: Normocephalic and atraumatic.      Right Ear: External ear normal.      Left Ear: External ear normal.      Nose: Nose normal.   Eyes:      General:         Right eye: No discharge.         Left eye: No discharge.      Conjunctiva/sclera: Conjunctivae normal.   Cardiovascular:      Rate and Rhythm: Normal rate and regular rhythm.      Heart sounds: Normal heart sounds.   Pulmonary:      Effort: Pulmonary effort is normal. No respiratory distress.      Breath sounds: Normal breath sounds.   Musculoskeletal:         General: Normal range of motion.      Cervical back: Normal range of motion.   Skin:     Coloration: Skin is not pale.      Findings: No rash.   Neurological:      Mental Status: She is alert and oriented to person, place, and time.   Psychiatric:         Behavior: Behavior normal.         Thought Content: Thought content normal.          Laboratory:  CBC:  Recent Labs   Lab Result Units 02/08/24  0832   WBC K/uL 3.31*   RBC M/uL 4.14   Hemoglobin g/dL 13.3   Hematocrit % 40.3   Platelets K/uL 227   MCV fL 97   MCH pg 32.1*   MCHC g/dL 33.0     CMP:  Recent Labs   Lab Result Units 02/08/24  0832   Glucose mg/dL 60*   Calcium mg/dL 9.7   Albumin g/dL 4.1   Total Protein g/dL 6.6   Sodium mmol/L 141   Potassium mmol/L 4.8   CO2 mmol/L 20*   Chloride mmol/L 105   BUN mg/dL 10   Alkaline Phosphatase U/L 36*   ALT U/L 23   AST U/L 29   Total Bilirubin mg/dL 0.7     URINALYSIS:  No results for input(s): "COLORU", "CLARITYU", "SPECGRAV", "PHUR", "PROTEINUA", "GLUCOSEU", "BILIRUBINCON", "BLOODU", "WBCU", "RBCU", "BACTERIA", "MUCUS", "NITRITE", "LEUKOCYTESUR", "UROBILINOGEN", "HYALINECASTS" in the last 2160 hours.   LIPIDS:  Recent Labs   Lab Result Units 02/08/24  0832   TSH uIU/mL 2.032   HDL mg/dL 48   Cholesterol mg/dL 292*   Triglycerides mg/dL 139   LDL " "Cholesterol mg/dL 216.2*   HDL/Cholesterol Ratio % 16.4*   Non-HDL Cholesterol mg/dL 244   Total Cholesterol/HDL Ratio  6.1*     TSH:  Recent Labs   Lab Result Units 02/08/24  0832   TSH uIU/mL 2.032     A1C:  No results for input(s): "HGBA1C" in the last 2160 hours.    Radiology:      Assessment/Plan     Grecia Carson is a 38 y.o.female with:    1. Wellness examination    2. Folic acid deficiency (non anemic)  -     FOLATE; Future; Expected date: 02/15/2024    3. NICOLAS (generalized anxiety disorder)  -     sertraline (ZOLOFT) 50 MG tablet; Take 1 tablet (50 mg total) by mouth once daily.  Dispense: 90 tablet; Refill: 3    4. Current moderate episode of major depressive disorder without prior episode  Assessment & Plan:  Trial of zoloft            Use of the [facility] Patient Portal discussed and encouraged during today's visit  -Continue current medications and maintain follow up with specialists.  Return to clinic in 1 year prn .  No future appointments.    Bella Thomas MD  2/15/2024 2:48 PM    Primary Care Internal Medicine                     "

## 2024-02-16 ENCOUNTER — TELEPHONE (OUTPATIENT)
Dept: OBSTETRICS AND GYNECOLOGY | Facility: CLINIC | Age: 39
End: 2024-02-16
Payer: COMMERCIAL

## 2024-02-16 NOTE — TELEPHONE ENCOUNTER
----- Message from Martine Hathaway sent at 2/16/2024 11:04 AM CST -----  Type:  Sooner Appointment Request    Caller is requesting a sooner appointment.  Caller declined first available appointment listed below.  Caller will not accept being placed on the waitlist and is requesting a message be sent to doctor.  Name of Caller:pt   When is the first available appointment?scheduling not allowed   Symptoms:Trying to conceive    Would the patient rather a call back or a response via [facility]? Call   Best Call Back Number:[phone]  Additional Information:

## 2024-02-16 NOTE — TELEPHONE ENCOUNTER
Patient stated that she will keep her appointment that is schedule in April 2024. She will call back if she changes her mind.

## 2024-02-28 ENCOUNTER — OFFICE VISIT (OUTPATIENT)
Dept: URGENT CARE | Facility: CLINIC | Age: 39
End: 2024-02-28
Payer: COMMERCIAL

## 2024-02-28 VITALS
HEIGHT: 64 IN | SYSTOLIC BLOOD PRESSURE: 137 MMHG | WEIGHT: 160 LBS | BODY MASS INDEX: 27.31 KG/M2 | RESPIRATION RATE: 18 BRPM | OXYGEN SATURATION: 100 % | DIASTOLIC BLOOD PRESSURE: 93 MMHG | HEART RATE: 77 BPM | TEMPERATURE: 98 F

## 2024-02-28 DIAGNOSIS — J02.9 SORE THROAT: ICD-10-CM

## 2024-02-28 DIAGNOSIS — J02.9 VIRAL PHARYNGITIS: Primary | ICD-10-CM

## 2024-02-28 LAB
CTP QC/QA: YES
MOLECULAR STREP A: NEGATIVE

## 2024-02-28 PROCEDURE — 87651 STREP A DNA AMP PROBE: CPT | Mod: QW,S$GLB,,

## 2024-02-28 PROCEDURE — 99212 OFFICE O/P EST SF 10 MIN: CPT | Mod: S$GLB,,,

## 2024-02-29 ENCOUNTER — PATIENT MESSAGE (OUTPATIENT)
Dept: OBSTETRICS AND GYNECOLOGY | Facility: CLINIC | Age: 39
End: 2024-02-29

## 2024-02-29 ENCOUNTER — TELEPHONE (OUTPATIENT)
Dept: OBSTETRICS AND GYNECOLOGY | Facility: CLINIC | Age: 39
End: 2024-02-29
Payer: COMMERCIAL

## 2024-02-29 ENCOUNTER — CLINICAL SUPPORT (OUTPATIENT)
Dept: OBSTETRICS AND GYNECOLOGY | Facility: CLINIC | Age: 39
End: 2024-02-29
Payer: COMMERCIAL

## 2024-02-29 ENCOUNTER — PATIENT MESSAGE (OUTPATIENT)
Dept: URGENT CARE | Facility: CLINIC | Age: 39
End: 2024-02-29
Payer: COMMERCIAL

## 2024-02-29 DIAGNOSIS — N91.2 AMENORRHEA: Primary | ICD-10-CM

## 2024-02-29 PROCEDURE — 99999 PR PBB SHADOW E&M-EST. PATIENT-LVL II: CPT | Mod: PBBFAC,,,

## 2024-02-29 NOTE — PATIENT INSTRUCTIONS
Magic mouthwash swish and spit every 4 hours as needed for sore throat.    What care is needed at home?   Call your regular doctor to let them know you were in the ED. Make a follow-up appointment if you were told to.  To help ease a sore throat you can:  Use a sore throat spray.  Suck on hard candy or throat lozenges.  Gargle with warm saltwater a few times each day.  You may want to take medicine like acetaminophen, ibuprofen, or naproxen for pain.  If you decide to take over-the-counter cough or cold medicines, follow the directions on the label carefully. Be sure you do not take more than one medicine that contains acetaminophen. Also, if you have a heart problem or high blood pressure, check with your doctor before you take any of these medicines.  Wash your hands often. This will help keep others healthy.  When do I need to get emergency help?   Call for an ambulance right away if:   You have trouble breathing.  Your neck, tongue, or throat is swelling.  You are drooling because you cannot swallow your saliva.  When do I need to call the doctor?   You have very bad pain in your throat that keeps you from eating or drinking anything.  There are large, painful lumps in your neck.  You have blisters in the back of your throat.  You have new or worsening symptoms.  - Rest.    - Drink plenty of fluids.    - Acetaminophen (tylenol) or Ibuprofen (advil,motrin) as directed as needed for fever/pain. Avoid tylenol if you have a history of liver disease. Do not take ibuprofen if you have a history of GI bleeding, kidney disease, or if you take blood thinners.     - Follow up with your PCP or specialty clinic as directed in the next 1-2 weeks if not improved or as needed.  You can call [phone] to schedule an appointment with the appropriate provider.    - Go to the ER or seek medical attention immediately if you develop new or worsening symptoms.     - You must understand that you have received an Urgent Care  treatment only and that you may be released before all of your medical problems are known or treated.   - You, the patient, will arrange for follow up care as instructed.   - If your condition worsens or fails to improve we recommend that you receive another evaluation at the ER immediately or contact your PCP to discuss your concerns or return here.

## 2024-02-29 NOTE — PROGRESS NOTES
"Subjective:      Patient ID: Grecia Carson is a 38 y.o. female.    Vitals:  height is 5' 4" (1.626 m) and weight is 72.6 kg (160 lb). Her oral temperature is 97.7 °F (36.5 °C). Her blood pressure is 137/93 (abnormal) and her pulse is 77. Her respiration is 18 and oxygen saturation is 100%.     Chief Complaint: Sore Throat    38-year-old female patient presents of a sore throat with painful swallowing for about a week.  Patient states symptoms has improved after she took 3 days of her daughters amoxicillin.  Patient also reports associated cough, congestion.  Denies any shortness a breath, chest pain, GI complaints, or any other associated symptoms        Sore Throat   This is a new problem. The current episode started 1 to 4 weeks ago. The problem has been gradually worsening. Sore throat worse side: general. The pain is at a severity of 2/10. The pain is mild. Associated symptoms include congestion and coughing. Pertinent negatives include no abdominal pain, ear discharge, ear pain, neck pain or shortness of breath. She has had exposure to strep. Treatments tried: amoxicillin. The treatment provided mild relief.       Constitution: Negative for activity change, appetite change, chills, sweating, fatigue and fever.   HENT:  Positive for congestion and sore throat. Negative for ear pain, ear discharge, foreign body in ear, tinnitus, hearing loss, dental problem, facial trauma, nosebleeds, foreign body in nose, postnasal drip, sinus pain and sinus pressure.    Neck: Negative for neck pain, neck stiffness, painful lymph nodes and neck swelling.   Cardiovascular:  Negative for chest trauma, chest pain, leg swelling and palpitations.   Eyes:  Negative for eye trauma, foreign body in eye, eye discharge, eye itching, eye pain and eye redness.   Respiratory:  Positive for cough. Negative for sleep apnea, chest tightness, sputum production, bloody sputum, COPD, shortness of breath and asthma.    Gastrointestinal:  Negative for " abdominal trauma, abdominal pain, abdominal bloating and history of abdominal surgery.   Endocrine: hair loss, cold intolerance and heat intolerance.   Genitourinary:  Negative for dysuria, frequency, urgency, urine decreased, flank pain and bladder incontinence.   Musculoskeletal:  Negative for pain, trauma, joint pain, joint swelling and abnormal ROM of joint.   Skin:  Negative for color change, pale, rash, wound, abrasion and laceration.   Allergic/Immunologic: Negative for environmental allergies, seasonal allergies, food allergies, eczema, asthma and immunocompromised state.   Neurological:  Negative for dizziness, history of vertigo, light-headedness, passing out, facial drooping, disorientation and altered mental status.   Hematologic/Lymphatic: Negative for swollen lymph nodes, easy bruising/bleeding and trouble clotting. Does not bruise/bleed easily.   Psychiatric/Behavioral:  Negative for altered mental status, disorientation, confusion, agitation, nervous/anxious and sleep disturbance. The patient is not nervous/anxious.       Objective:     Physical Exam   Constitutional: She is oriented to person, place, and time. She appears well-developed. She is cooperative.  Non-toxic appearance. She does not appear ill. No distress.   HENT:   Head: Normocephalic and atraumatic.   Ears:   Right Ear: Hearing, tympanic membrane, external ear and ear canal normal.   Left Ear: Hearing, tympanic membrane, external ear and ear canal normal.   Nose: Congestion present. No mucosal edema, rhinorrhea or nasal deformity. No epistaxis. Right sinus exhibits no maxillary sinus tenderness and no frontal sinus tenderness. Left sinus exhibits no maxillary sinus tenderness and no frontal sinus tenderness.   Mouth/Throat: Uvula is midline, oropharynx is clear and moist and mucous membranes are normal. No trismus in the jaw. Normal dentition. No uvula swelling. No oropharyngeal exudate, posterior oropharyngeal edema or posterior  oropharyngeal erythema.   Eyes: Conjunctivae and lids are normal. No scleral icterus.   Neck: Trachea normal and phonation normal. Neck supple. No edema present. No erythema present. No neck rigidity present.   Cardiovascular: Normal rate, regular rhythm, normal heart sounds and normal pulses.   Pulmonary/Chest: Effort normal and breath sounds normal. No stridor. No respiratory distress. She has no decreased breath sounds. She has no wheezes. She has no rhonchi. She has no rales. She exhibits no tenderness.   Abdominal: Normal appearance and bowel sounds are normal. She exhibits no distension and no mass. Soft. There is no abdominal tenderness. There is no rebound, no guarding, no left CVA tenderness and no right CVA tenderness. No hernia.   Musculoskeletal: Normal range of motion.         General: No deformity. Normal range of motion.   Neurological: She is alert and oriented to person, place, and time. She exhibits normal muscle tone. Coordination normal.   Skin: Skin is warm, dry, intact, not diaphoretic and not pale.   Psychiatric: Her speech is normal and behavior is normal. Judgment and thought content normal.   Nursing note and vitals reviewed.    Results for orders placed or performed in visit on 02/28/24   POCT Strep A, Molecular   Result Value Ref Range    Molecular Strep A, POC Negative Negative     Acceptable Yes         Assessment:     1. Viral pharyngitis    2. Sore throat        Plan:       Viral pharyngitis  -     diphenhydrAMINE-aluminum-magnesium hydroxide-simethicone-LIDOcaine viscous HCl 2%; Swish and spit 15 mLs every 4 (four) hours as needed (Sore throat). 220ml total  Dispense: 220 each; Refill: 0    Sore throat  -     POCT Strep A, Molecular             Additional MDM:     Heart Failure Score:   COPD = No

## 2024-02-29 NOTE — PROGRESS NOTES
Spoke with patient for a total of 30 minutes during virtual visit.  Updated chart to reflect up to date patient demographics.  Allergies, medications, pharmacy, medical/family history and OB history updated.  Patient was guided through expectations of care during pregnancy.  Pregnancy confirmation, dating u/s & first routine OB appts scheduled.  Education provided & questions answered. Encouraged to send message or call office with any questions/concerns. Verbalized understanding.     Discussed with pt:    taking PNV   denies n/v  C/o mild occ cramping/no spotting  Precautions discussed  Referred to [email or web address] for Preg A to Z guide & class schedule   Discussed benefits of breastfeeding - plans to breastfeed   Delivered previous at 36 wks

## 2024-02-29 NOTE — TELEPHONE ENCOUNTER
LMP: 1/26/24  Called pt and scheduled her with the navigator to set up her Pregnancy confirmation as well as her u/s          ----- Message from Moriah Johnson sent at 2/29/2024  8:29 AM CST -----  Type:  Sooner Apoointment Request    Caller is requesting a sooner appointment.  Caller will accept being placed on the waitlist and is requesting a message be sent to doctor.  Name of Caller:pt  Symptoms:pregnant  Would the patient rather a call back or a response via [facility]? call  Best Call Back Number:[phone]  Additional Information: pt would like to come in no OB navigator

## 2024-03-08 ENCOUNTER — PATIENT MESSAGE (OUTPATIENT)
Dept: OBSTETRICS AND GYNECOLOGY | Facility: CLINIC | Age: 39
End: 2024-03-08
Payer: COMMERCIAL

## 2024-03-08 NOTE — TELEPHONE ENCOUNTER
Yes, unfortunately we can't write any excuses or notes for a patient we haven't established care with. Will be happy to talk about it and write what we need when I see her.

## 2024-03-08 NOTE — LETTER
March 13, 2024    Grecia Carson  [address] [id number]         [place] [facility]  [address]  [address]  [facility] [address]  Phone: [phone] March 13, 2024     Patient: Grecia Carson   YOB: 1985   Date of Visit: 3/8/2024       To Whom It May Concern:    It is my medical opinion that Grecia Carson should if possible limit work hours to 40 hours a week or 8 hours a day and should refrain from lifting more then 20lbs.     If you have any questions or concerns, please don't hesitate to call.    Sincerely,        Mary Chaudhari MD

## 2024-03-13 NOTE — TELEPHONE ENCOUNTER
Note written. She should be able to print it under letters or we can give it to her when we see her for the first time.

## 2024-03-26 ENCOUNTER — PROCEDURE VISIT (OUTPATIENT)
Dept: MATERNAL FETAL MEDICINE | Facility: CLINIC | Age: 39
End: 2024-03-26
Payer: COMMERCIAL

## 2024-03-26 ENCOUNTER — OFFICE VISIT (OUTPATIENT)
Dept: OBSTETRICS AND GYNECOLOGY | Facility: CLINIC | Age: 39
End: 2024-03-26
Payer: COMMERCIAL

## 2024-03-26 VITALS
WEIGHT: 160.06 LBS | SYSTOLIC BLOOD PRESSURE: 126 MMHG | BODY MASS INDEX: 27.47 KG/M2 | DIASTOLIC BLOOD PRESSURE: 82 MMHG

## 2024-03-26 DIAGNOSIS — O09.521 MULTIGRAVIDA OF ADVANCED MATERNAL AGE IN FIRST TRIMESTER: ICD-10-CM

## 2024-03-26 DIAGNOSIS — Z12.4 PAP SMEAR FOR CERVICAL CANCER SCREENING: ICD-10-CM

## 2024-03-26 DIAGNOSIS — N91.2 AMENORRHEA: ICD-10-CM

## 2024-03-26 DIAGNOSIS — Z3A.08 8 WEEKS GESTATION OF PREGNANCY: Primary | ICD-10-CM

## 2024-03-26 DIAGNOSIS — F32.1 CURRENT MODERATE EPISODE OF MAJOR DEPRESSIVE DISORDER WITHOUT PRIOR EPISODE: ICD-10-CM

## 2024-03-26 DIAGNOSIS — Z11.3 SCREENING EXAMINATION FOR STD (SEXUALLY TRANSMITTED DISEASE): ICD-10-CM

## 2024-03-26 LAB
BILIRUB SERPL-MCNC: NEGATIVE MG/DL
BLOOD URINE, POC: NEGATIVE
CLARITY, POC UA: CLEAR
COLOR, POC UA: ABNORMAL
GLUCOSE UR QL STRIP: NORMAL
KETONES UR QL STRIP: NEGATIVE
LEUKOCYTE ESTERASE URINE, POC: NEGATIVE
NITRITE, POC UA: NEGATIVE
PH, POC UA: 7
PROTEIN, POC: ABNORMAL
SPECIFIC GRAVITY, POC UA: 1.02
UROBILINOGEN, POC UA: NORMAL

## 2024-03-26 PROCEDURE — 3008F BODY MASS INDEX DOCD: CPT | Mod: CPTII,S$GLB,, | Performed by: OBSTETRICS & GYNECOLOGY

## 2024-03-26 PROCEDURE — 88175 CYTOPATH C/V AUTO FLUID REDO: CPT | Performed by: OBSTETRICS & GYNECOLOGY

## 2024-03-26 PROCEDURE — 87491 CHLMYD TRACH DNA AMP PROBE: CPT | Performed by: OBSTETRICS & GYNECOLOGY

## 2024-03-26 PROCEDURE — 99999 PR PBB SHADOW E&M-EST. PATIENT-LVL III: CPT | Mod: PBBFAC,,, | Performed by: OBSTETRICS & GYNECOLOGY

## 2024-03-26 PROCEDURE — 81514 NFCT DS BV&VAGINITIS DNA ALG: CPT | Performed by: OBSTETRICS & GYNECOLOGY

## 2024-03-26 PROCEDURE — 87086 URINE CULTURE/COLONY COUNT: CPT | Performed by: OBSTETRICS & GYNECOLOGY

## 2024-03-26 PROCEDURE — 1159F MED LIST DOCD IN RCRD: CPT | Mod: CPTII,S$GLB,, | Performed by: OBSTETRICS & GYNECOLOGY

## 2024-03-26 PROCEDURE — 76801 OB US < 14 WKS SINGLE FETUS: CPT | Mod: S$GLB,,, | Performed by: OBSTETRICS & GYNECOLOGY

## 2024-03-26 PROCEDURE — 3079F DIAST BP 80-89 MM HG: CPT | Mod: CPTII,S$GLB,, | Performed by: OBSTETRICS & GYNECOLOGY

## 2024-03-26 PROCEDURE — 3074F SYST BP LT 130 MM HG: CPT | Mod: CPTII,S$GLB,, | Performed by: OBSTETRICS & GYNECOLOGY

## 2024-03-26 PROCEDURE — 99213 OFFICE O/P EST LOW 20 MIN: CPT | Mod: S$GLB,,, | Performed by: OBSTETRICS & GYNECOLOGY

## 2024-03-26 RX ORDER — ONDANSETRON 4 MG/1
4 TABLET, ORALLY DISINTEGRATING ORAL EVERY 4 HOURS PRN
Qty: 20 TABLET | Refills: 1 | Status: SHIPPED | OUTPATIENT
Start: 2024-03-26 | End: 2024-04-25

## 2024-03-26 RX ORDER — PROMETHAZINE HYDROCHLORIDE 25 MG/1
25 TABLET ORAL
Qty: 30 TABLET | Refills: 1 | Status: SHIPPED | OUTPATIENT
Start: 2024-03-26 | End: 2024-04-25

## 2024-03-26 NOTE — PROGRESS NOTES
CC: positive pregnancy test     HPI:   Grecia Carson 38 y.o.  at 8 6/7 wga is here for + UPT. She hasn't seen anyone yet for this pregnancy. She complains of nausea, throwing up occasionally,gets GERD and pressure on her chest. Had n/V the whole last pregnancy so this doesn't seem as bad     OB history: 37 wga delivery     Patient's last menstrual period was 2024 (exact date).     Past Medical History:   Diagnosis Date    Encounter for insertion of ParaGard IUD 2018    @ [facility], per pt        Past Surgical History:   Procedure Laterality Date    CATARACT EXTRACTION, BILATERAL  2017    EYE SURGERY  2018    Cataract    VAGINAL DELIVERY         Family History   Problem Relation Age of Onset    Hypertension Mother     No Known Problems Father     Breast cancer Neg Hx     Colon cancer Neg Hx     Ovarian cancer Neg Hx        Social History     Socioeconomic History    Marital status: Significant Other   Tobacco Use    Smoking status: Never    Smokeless tobacco: Never   Substance and Sexual Activity    Alcohol use: Not Currently     Alcohol/week: 1.0 standard drink of alcohol     Types: 1 Cans of beer per week    Drug use: No    Sexual activity: Yes     Partners: Male     Birth control/protection: Other-see comments     Comment: Paraguard       OB History          2    Para   1    Term   0       1    AB        Living   1         SAB        IAB        Ectopic        Multiple        Live Births   1                  COMPREHENSIVE GYN HISTORY:  PAP History: h/o bnormal Pap with colpo about 10 years ago but normal since  Infection History: Denies STDs. Denies PID.  Benign History: has 1  uterine fibroids. Denies ovarian cysts. Denies endometriosis.   Cancer History: Denies cervical cancer. Denies uterine cancer or hyperplasia. Denies ovarian cancer. Denies vulvar cancer or pre-cancer. Denies vaginal cancer or pre-cancer. Denies breast cancer. Denies colon cancer.  Sexual Activity History:   reports  being sexually active and has had partner(s) who are male. She reports using the following method of birth control/protection: Other-see comments.   Menstrual History: Age of menarche: nl/mon     ROS:  Negative     PE:   /82   Wt 72.6 kg (160 lb 0.9 oz)   LMP 01/26/2024 (Exact Date)   BMI 27.47 kg/m²     APPEARANCE: Well nourished, well developed, in no acute distress.  NECK: Neck symmetric without  thyromegaly.    PELVIC:   VULVA: No lesions. Normal female genitalia.  URETHRAL MEATUS: Normal size and location, no lesions, no prolapse.  URETHRA: No masses, tenderness, prolapse or scarring.  VAGINA: Moist and well rugated, no discharge, no significant cystocele or rectocele.  CERVIX: No lesions and discharge.  UTERUS: 8wk size, regular shape, mobile, non-tender, bladder base nontender.  ADNEXA: No masses or tenderness.        1. 8 weeks gestation of pregnancy    2. Multigravida of advanced maternal age in first trimester    3. Current moderate episode of major depressive disorder without prior episode        Plan:    1. IOB labs and dating US ordered, PNV ordered.   2 see back in 4 weeks.   3. AMA: discussed genetic screening and desires MT21, will start ASA when over 12 wga. 32 week growth US   4. Fibroid: small   5. N/V: Discussed with patient lifestyle modifications that can help with N/V such as eating small meals every 1-2 hours to avoid getting over full or too hungry, eliminating spicy and fatty foods, eating high protein snacks and crackers before get out of bed and stopping iron supplements in the first trimester and taking folic acid instead of the PNV if needed. Can try leticia, vitamin B6 25mg +/- 12.5 mg unisome up to three times a day. Discussed that if this isn't effective then I sent in phenergan PO for her to try and if that not effective then sent in Zofran ODT. We discussed the most common side effects including constipation, headaches and drowsiness . We discussed the limited studies with  zofran  6. Depression: stopped zoloft, doesn't feel like she needs it and would prefer to be off.      Face to Face time with patient: 30 minutes of total time spent on the encounter, which includes face to face time and non-face to face time preparing to see the patient (eg, review of tests), Obtaining and/or reviewing separately obtained history, Documenting clinical information in the electronic or other health record, Independently interpreting results (not separately reported) and communicating results to the patient/family/caregiver, or Care coordination (not separately reported).

## 2024-03-27 LAB
C TRACH DNA SPEC QL NAA+PROBE: NOT DETECTED
N GONORRHOEA DNA SPEC QL NAA+PROBE: NOT DETECTED

## 2024-03-28 LAB
BACTERIA UR CULT: NORMAL
BACTERIAL VAGINOSIS DNA: NEGATIVE
CANDIDA GLABRATA DNA: NEGATIVE
CANDIDA KRUSEI DNA: NEGATIVE
CANDIDA RRNA VAG QL PROBE: NEGATIVE
T VAGINALIS RRNA GENITAL QL PROBE: NEGATIVE

## 2024-04-02 ENCOUNTER — PATIENT MESSAGE (OUTPATIENT)
Dept: OBSTETRICS AND GYNECOLOGY | Facility: CLINIC | Age: 39
End: 2024-04-02
Payer: COMMERCIAL

## 2024-04-04 ENCOUNTER — LAB VISIT (OUTPATIENT)
Dept: LAB | Facility: HOSPITAL | Age: 39
End: 2024-04-04
Attending: OBSTETRICS & GYNECOLOGY
Payer: COMMERCIAL

## 2024-04-04 DIAGNOSIS — Z3A.08 8 WEEKS GESTATION OF PREGNANCY: ICD-10-CM

## 2024-04-04 LAB
ABO + RH BLD: NORMAL
BASOPHILS # BLD AUTO: 0.01 K/UL (ref 0–0.2)
BASOPHILS NFR BLD: 0.2 % (ref 0–1.9)
BLD GP AB SCN CELLS X3 SERPL QL: NORMAL
DIFFERENTIAL METHOD BLD: ABNORMAL
EOSINOPHIL # BLD AUTO: 0 K/UL (ref 0–0.5)
EOSINOPHIL NFR BLD: 0.5 % (ref 0–8)
ERYTHROCYTE [DISTWIDTH] IN BLOOD BY AUTOMATED COUNT: 13.1 % (ref 11.5–14.5)
HBV SURFACE AG SERPL QL IA: NORMAL
HCT VFR BLD AUTO: 38.4 % (ref 37–48.5)
HCV AB SERPL QL IA: NORMAL
HGB BLD-MCNC: 12.7 G/DL (ref 12–16)
HIV 1+2 AB+HIV1 P24 AG SERPL QL IA: NORMAL
IMM GRANULOCYTES # BLD AUTO: 0.04 K/UL (ref 0–0.04)
IMM GRANULOCYTES NFR BLD AUTO: 0.7 % (ref 0–0.5)
LYMPHOCYTES # BLD AUTO: 1 K/UL (ref 1–4.8)
LYMPHOCYTES NFR BLD: 17.9 % (ref 18–48)
MCH RBC QN AUTO: 32.6 PG (ref 27–31)
MCHC RBC AUTO-ENTMCNC: 33.1 G/DL (ref 32–36)
MCV RBC AUTO: 99 FL (ref 82–98)
MONOCYTES # BLD AUTO: 0.4 K/UL (ref 0.3–1)
MONOCYTES NFR BLD: 7.7 % (ref 4–15)
NEUTROPHILS # BLD AUTO: 4 K/UL (ref 1.8–7.7)
NEUTROPHILS NFR BLD: 73 % (ref 38–73)
NRBC BLD-RTO: 0 /100 WBC
PLATELET # BLD AUTO: 194 K/UL (ref 150–450)
PMV BLD AUTO: 11 FL (ref 9.2–12.9)
RBC # BLD AUTO: 3.89 M/UL (ref 4–5.4)
WBC # BLD AUTO: 5.48 K/UL (ref 3.9–12.7)

## 2024-04-04 PROCEDURE — 85025 COMPLETE CBC W/AUTO DIFF WBC: CPT | Performed by: OBSTETRICS & GYNECOLOGY

## 2024-04-04 PROCEDURE — 87389 HIV-1 AG W/HIV-1&-2 AB AG IA: CPT | Performed by: OBSTETRICS & GYNECOLOGY

## 2024-04-04 PROCEDURE — 87340 HEPATITIS B SURFACE AG IA: CPT | Performed by: OBSTETRICS & GYNECOLOGY

## 2024-04-04 PROCEDURE — 86850 RBC ANTIBODY SCREEN: CPT | Performed by: OBSTETRICS & GYNECOLOGY

## 2024-04-04 PROCEDURE — 36415 COLL VENOUS BLD VENIPUNCTURE: CPT | Performed by: OBSTETRICS & GYNECOLOGY

## 2024-04-04 PROCEDURE — 86592 SYPHILIS TEST NON-TREP QUAL: CPT | Performed by: OBSTETRICS & GYNECOLOGY

## 2024-04-04 PROCEDURE — 86762 RUBELLA ANTIBODY: CPT | Performed by: OBSTETRICS & GYNECOLOGY

## 2024-04-04 PROCEDURE — 83021 HEMOGLOBIN CHROMOTOGRAPHY: CPT | Performed by: OBSTETRICS & GYNECOLOGY

## 2024-04-04 PROCEDURE — 86803 HEPATITIS C AB TEST: CPT | Performed by: OBSTETRICS & GYNECOLOGY

## 2024-04-05 LAB
RPR SER QL: NORMAL
RUBV IGG SER-ACNC: >400 IU/ML
RUBV IGG SER-IMP: REACTIVE

## 2024-04-08 LAB
HGB A2 MFR BLD HPLC: 2.2 % (ref 2.2–3.2)
HGB FRACT BLD ELPH-IMP: NORMAL
HGB FRACT BLD ELPH-IMP: NORMAL

## 2024-04-09 ENCOUNTER — TELEPHONE (OUTPATIENT)
Dept: OBSTETRICS AND GYNECOLOGY | Facility: CLINIC | Age: 39
End: 2024-04-09
Payer: COMMERCIAL

## 2024-04-09 DIAGNOSIS — O35.11X1 MATERNAL CARE FOR (SUSPECTED) CHROMOSOMAL ABNORMALITY IN FETUS, TRISOMY 13, FETUS 1: Primary | ICD-10-CM

## 2024-04-09 NOTE — TELEPHONE ENCOUNTER
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 We discussed the syndrome and the complications that arise in babies with the condition and often it is not compatible with life. We reviewed that it was a screening test and PPV of 25% quoted by the company if she wanted to do further definitive testing it would be amnio or choronic villi sampling. She is interested in doing more testing. Will send referral for [facility].

## 2024-04-11 ENCOUNTER — PATIENT MESSAGE (OUTPATIENT)
Dept: MATERNAL FETAL MEDICINE | Facility: CLINIC | Age: 39
End: 2024-04-11

## 2024-04-11 ENCOUNTER — OFFICE VISIT (OUTPATIENT)
Dept: MATERNAL FETAL MEDICINE | Facility: CLINIC | Age: 39
End: 2024-04-11
Payer: COMMERCIAL

## 2024-04-11 DIAGNOSIS — O28.5: Primary | ICD-10-CM

## 2024-04-11 PROCEDURE — 96040 PR GENETIC COUNSELING, EACH 30 MIN: CPT | Mod: 95,,, | Performed by: GENETIC COUNSELOR, MS

## 2024-04-11 NOTE — PROGRESS NOTES
Office Visit - Genetic Counseling Evaluation   Grecia Carson  : 1985  MRN: [id number]  REFERRING PROVIDER: Dr. Larissa Hall  PARTNER NAME: Wing    DATE OF SERVICE: 24  TIME SPENT: 45 min    REASON FOR CONSULT:  Grecia Carson, a 38 y.o. female with a positive cfDNA screen for Trisomy 13 was referred for genetic counseling to discuss this result and options for next steps. She came to the appointment alone.     OBSETRIC HISTORY   AGE AT KAT: 39  KAT: 10/30/2014  GESTATION: [place]  GESTATIONAL AGE:11w1d    PREGNANCY HISTORY  G1: 36w-   G2: Current    MEDICAL HISTORY:  MEDICATIONS/EXPOSURES: Not discussed today    FAMILY HISTORY:  Please see scanned pedigree in patient's chart under media. Patient's ancestry is [place] and [place] citizen. FOB ancestry is Arabic, Marshallese and Cajun. Consanguinity was denied.     This is the first pregnancy for Wing and Grecia. Grecia has a daughter with a previous partner who is 10y and healthy.    Grecia has one sister who has a 20y old son, they are both healthy. Her father  from complications with alcohol abuse at 54y. Her mother is alive and healthy, she is 69y.     Wing is 35y with a history of ADHD. He has two brothers who are healthy and have no children. His mother and father are also healthy.    Additional history negative for multiple miscarriages/stillbirth, developmental delay/intellectual disability, and known genetic disorders. Complete pedigree will be linked to this encounter and can be viewed under the Media tab. The information provided is based on the patient and/or their reproductive partner's recollection of the family history and in the absence of complete medical records. If the family history changes or if more information is obtained, they were asked to contact us as this may alter the recommendations or impression of the family history.     PAST TESTING  Patient carrier screening: Normal electrophoresis  Reproductive partner carrier screening:  NA  Parental Karyotypes: NA    PREGNANCY TESTING  cfDNA for aneuploidy: Positive Trisomy 13  Diagnostic testing: NA  Fetal karyotype: NA    COUNSELING:   cfDNA positive for Trisomy 13  Grecia is a 38 y.o.y.o. . Recent cell-free DNA testing (cfDNA) was done by [facility] lab indicated an increased risk of Trisomy 13 (PPV of 25.3%)     We reviewed the limitations of cfDNA testing in the diagnosis of Trisomy 13 and other chromosome disorders.    Prenatal diagnostic testing options for definitive cytogenetic confirmation of Trisomy 13 (CVS and amniocentesis) were discussed. The procedure-related risk for pregnancy loss of ~1:500 for CVS and ~1:900 for amniocentesis was reviewed, as was the 1 - 2% risk for a mosaic result with CVS. For this indication, analysis of chorionic villi or amniocytes typically includes FISH for chromosomes 13, 18 and 21 and the sex chromosomes and standard chromosome analysis. Turn-around time for this testing was discussed. In addition, we discussed Trisomy 13 and provided an opportunity for the patient to ask questions.     Further analysis by chromosomal microarray testing is available if standard chromosome analysis is normal. Alternatively, chromosome microarray (CMA) can be chosen as the primary analytic test. In cases without ultrasound findings, CMA is expected to detect an additional 1.6% of chromosome alterations (microdeletions/microduplications) not seen on standard chromosome analysis. If ultrasound abnormalities are present, the additional yield with CMA is variable depending on the specific ultrasound abnormality seen, but it has an overall additional yield of ~ 6%. Variants of unknown significance (VUS) are found in up to 5.8%* with CMA. The possibilities of uncovering consanguinity with CMA was reviewed.    The range of developmental impairment and physical structural abnormalities that are seen with Trisomy 13 was reviewed.    Risks and benefits of testing were  discussed today. Including the options for pregnancy continuation vs. termination. This was discussed briefly for the purposes of anticipatory guidance and was discussed non-directively.     The patient wishes to undergo diagnostic testing for Trisomy 13 and has chosen to undergo a diagnostic procedure, she is uncertain which. Grecia is scheduled for a CVS on Thursday 4/17 which she may reschedule for an amniocentesis.     *Indra ROSADO, Jose Alfredo AP, Efra BV, Melanie K, Lorin [place], Aubrey [place]. Variants of uncertain significance in prenatal microarrays: a retrospective cohort study. BJOG. 2021 Jan;128(2):431-438. doi: 10.1111/2103-3178.09660. Epub 2020 Aug 18. PMID: 24470388; PMCID: [id number].      DISCUSSION & IMPRESSION:  Grecia is a 38 y.o. female with a positive cfDNA screening for Trisomy 13. She is very worried about this result. When discussing the options for diagnostic procedures she reported feeling very concerned about the risk for confined placental mosaisicm and was leaning toward an amniocentesis to feel more confident in the result. She is very clear that she would not proceed with the pregnancy if the result was positive for Trisomy 13. She asked if her fetal heart rate of 189 on her dating ultrasound was a concern, we discussed that there is no correlation between fetal heart rate at 9w and aneuploidy. I followed up with Dr. Zhang and he stated that this heart rate itself was not a concern, this was conveyed to the patient after the visit via message. She will reach out with questions or to reschedule her procedure. A copy of her reported was sent to her.     TESTING OPTIONS  Diagnostic Testing: CVS or Amniocentesis  Carrier Screening:NA  Pregnancy Options: Termination options were briefly discussed  Recurrence Risk: To be determined  Procedures/labs DECLINED today: EFRAIN Paige stated that she understood the information we discussed today. She is going to take a couple of days to weigh her options and will  reach out if she needs to reschedule or just add on an appointment for an amniocentesis.      We reviewed Grecia's medical and family history. We discussed basics of genetics and screening. Grecia was understanding of the information discussed in clinic and all questions were answered.     RECOMMENDATIONS:  FISH with reflex to chromosome analysis if abnormal microarray if normal    Tricia Barnes MS, CGC  Licensed, Certified Genetic Counselor  [facility]    The patient location is: Home ([place])  The chief complaint leading to consultation is: Positive cfDNA screening    Visit type: audiovisual    Face to Face time with patient: 45 min  55 minutes of total time spent on the encounter, which includes face to face time and non-face to face time preparing to see the patient (eg, review of tests), Obtaining and/or reviewing separately obtained history, Documenting clinical information in the electronic or other health record, Independently interpreting results (not separately reported) and communicating results to the patient/family/caregiver, or Care coordination (not separately reported).         Each patient to whom he or she provides medical services by telemedicine is:  (1) informed of the relationship between the physician and patient and the respective role of any other health care provider with respect to management of the patient; and (2) notified that he or she may decline to receive medical services by telemedicine and may withdraw from such care at any time.    Notes:

## 2024-04-18 ENCOUNTER — PATIENT MESSAGE (OUTPATIENT)
Dept: OBSTETRICS AND GYNECOLOGY | Facility: CLINIC | Age: 39
End: 2024-04-18
Payer: COMMERCIAL

## 2024-04-18 ENCOUNTER — PROCEDURE VISIT (OUTPATIENT)
Dept: MATERNAL FETAL MEDICINE | Facility: CLINIC | Age: 39
End: 2024-04-18
Payer: COMMERCIAL

## 2024-04-18 ENCOUNTER — OFFICE VISIT (OUTPATIENT)
Dept: MATERNAL FETAL MEDICINE | Facility: CLINIC | Age: 39
End: 2024-04-18
Payer: COMMERCIAL

## 2024-04-18 VITALS
HEIGHT: 64 IN | WEIGHT: 156.5 LBS | DIASTOLIC BLOOD PRESSURE: 76 MMHG | BODY MASS INDEX: 26.72 KG/M2 | SYSTOLIC BLOOD PRESSURE: 113 MMHG

## 2024-04-18 DIAGNOSIS — O35.11X1 MATERNAL CARE FOR (SUSPECTED) CHROMOSOMAL ABNORMALITY IN FETUS, TRISOMY 13, FETUS 1: ICD-10-CM

## 2024-04-18 DIAGNOSIS — O28.5: Primary | ICD-10-CM

## 2024-04-18 DIAGNOSIS — O09.899 HX OF PRETERM DELIVERY, CURRENTLY PREGNANT: Primary | ICD-10-CM

## 2024-04-18 PROCEDURE — 3008F BODY MASS INDEX DOCD: CPT | Mod: CPTII,S$GLB,, | Performed by: OBSTETRICS & GYNECOLOGY

## 2024-04-18 PROCEDURE — 3074F SYST BP LT 130 MM HG: CPT | Mod: CPTII,S$GLB,, | Performed by: OBSTETRICS & GYNECOLOGY

## 2024-04-18 PROCEDURE — 76813 OB US NUCHAL MEAS 1 GEST: CPT | Mod: S$GLB,,, | Performed by: OBSTETRICS & GYNECOLOGY

## 2024-04-18 PROCEDURE — 99214 OFFICE O/P EST MOD 30 MIN: CPT | Mod: S$GLB,,, | Performed by: OBSTETRICS & GYNECOLOGY

## 2024-04-18 PROCEDURE — 3078F DIAST BP <80 MM HG: CPT | Mod: CPTII,S$GLB,, | Performed by: OBSTETRICS & GYNECOLOGY

## 2024-04-18 PROCEDURE — 1159F MED LIST DOCD IN RCRD: CPT | Mod: CPTII,S$GLB,, | Performed by: OBSTETRICS & GYNECOLOGY

## 2024-04-18 PROCEDURE — 99999 PR PBB SHADOW E&M-EST. PATIENT-LVL III: CPT | Mod: PBBFAC,,, | Performed by: OBSTETRICS & GYNECOLOGY

## 2024-04-18 NOTE — ASSESSMENT & PLAN NOTE
Please see original consult by our genetic counselor for full counseling.    Patient had an abnormal NIPT for Trisomy 13 positive (PPV 25.3%). She was previously counseled on significance of this testing and options for testing moving forward.  It had been decided to obtain an early anatomy today to see if any abnormalities were seen based on this NIPT.  We discussed today's ultrasound.    We discussed that a cystic hygroma is caused by an abnormality in the development of the connection between lymphatic drainage system and jugular veins early in pregnancy. A cystic hygroma can be caused by chromosome abnormalities, genetic syndromes, or can occur in isolation. About one-third of euploid fetuses with first trimester septated cystic hygromas have major structural anomalies. It can also be associated with congenital heart defects, diaphragmatic hernia, renal anomalies, body stalk disruption, and abdominal wall defects. Approximately 50-60% of fetuses with a cystic hygroma has a chromosome abnormality. Over time, skin edema and hydrops commonly develop. Chromosomes and aneuploidy were discussed in detail. Christianson syndrome, Down syndrome and trisomy 18 were discussed as the most common chromosome abnormalities associated with cystic hygroma. However, other genetic syndromes such as multiple pterygium, Debbie, and Iraheta syndrome are possible. Normal pediatric outcomes occur in 15 to 30% of fetuses with first trimester cystic hygromas, but the prognosis for a specific fetus depends upon the presence or absence of associated findings. In the absence of aneuploidy or structural anomalies, the size of the first trimester cystic hygroma appears to be the most important determinant of outcome. The prognosis is less favorable with a large cystic hygroma compared with a small cystic hygroma or enlarged nuchal translucency.   If the fetus does not have a chromosome abnormality, a thorough evaluation of the fetus after delivery  is recommended to aid in clarification of recurrence risks.    We also discussed the generalized fetal subcutaneous edema that was seen and how this may be associated with progression from the cystic hygroma finding. The finding of generalized subcutaneous edema on the first exam likely raises the risk for a fetal chromosome abnormality or structural abnormality modestly.   We discussed how today's ultrasound increases the concern based on the NIPT findings.  Patient was made aware of the risk for SAB based on today's ultrasound findings.    We again reviewed the testing options available prenatally for further assessment for chromosomal abnormalities and structural anomalies.  The diagnostic testing options of CVS and amniocentesis, with a review of the risks, benefits, and limitations of the procedures, were discussed. A procedure-associated pregnancy loss rate of 1:800 for CVS and 1:1000 for amniocentesis was quoted.   Turn-around times for test results from CVS/amniocentesis were reviewed. Pregnancy management options, based on test results, were reviewed non-directively, as were the legal restrictions regarding pregnancy termination in [place]. We discussed options for TAB and R/B of this procedure. Aware it is her choice and we will support her with any decision she makes.  Ultrasound was reviewed as the best option to f/u for progression/regression of the subcutaneous edema and for assessment of fetal structural anomalies and growth disturbances.   She voiced her concern with the possibility of CPMs on CVS testing. We discussed limitations and interpretation of this in the setting of abnormal US findings.  At the end of our discussion, patient opted to have an amniocentesis performed at earliest available.     Recommendations:  Follow up at 15 weeks for amniocentesis - scheduled  Targeted ultrasound is recommended at approximately 19-20 weeks gestation.  Fetal ECHO at 22-24 weeks  Further testing and  counseling will be provided based on these results.

## 2024-04-18 NOTE — ASSESSMENT & PLAN NOTE
Patient mentioned her prior pregnancy was delivered during the 36th weeks (unsure exact timing) for PPROM.   We did not discuss this in detail today.  Will plan to address at future appointments pending results from amniocentesis.

## 2024-04-18 NOTE — PROGRESS NOTES
MATERNAL-FETAL MEDICINE   CONSULT NOTE    Provider requesting consultation: Rafael    SUBJECTIVE:     Ms. Grecia Carson is a 38 y.o.  female with IUP at 12w1d who is seen in consultation by MFM for evaluation and management of:  Problem   Maternal Care for (Suspected) Chromosomal Abnormality in Fetus, Trisomy 13, Fetus 1   Hx of  Delivery, Currently Pregnant     Patient has no complaints today. She is overall feeling well.  Patient denies any contractions/cramping, vaginal bleeding or leakage of fluid.       Medication List with Changes/Refills   Current Medications    ONDANSETRON (ZOFRAN-ODT) 4 MG TBDL    Take 1 tablet (4 mg total) by mouth every 4 (four) hours as needed (nausea).    PRENATAL VIT NO.124/IRON/FOLIC (PRENATAL VITAMIN ORAL)    Take 1 Dose by mouth Daily.    PROMETHAZINE (PHENERGAN) 25 MG TABLET    Take 1 tablet (25 mg total) by mouth every 4 to 6 hours as needed for Nausea.    WESTAB PLUS 27 MG IRON- 1 MG TAB    Take 1 tablet by mouth once daily.     Review of patient's allergies indicates:  No Known Allergies    PMH:  Past Medical History:   Diagnosis Date    Encounter for insertion of ParaGard IUD 2018    @ [facility], per pt      PObHx:  OB History    Para Term  AB Living   2 1 0 1   1   SAB IAB Ectopic Multiple Live Births           1      # Outcome Date GA Lbr Rohit/2nd Weight Sex Type Anes PTL Lv   2 Current            1  14 36w0d  3.572 kg (7 lb 14 oz) F Vag-Spont   OLENA     PSH:  Past Surgical History:   Procedure Laterality Date    CATARACT EXTRACTION, BILATERAL  2017    EYE SURGERY  2018    Cataract    VAGINAL DELIVERY         Family history:family history includes Hypertension in her mother; No Known Problems in her father.    Social history: reports that she has never smoked. She has never used smokeless tobacco. She reports that she does not currently use alcohol after a past usage of about 1.0 standard drink of alcohol per week. She reports that she does  "not use drugs.    Genetic history:  The patient denies any inherited genetic diseases or birth defects in herself or her partner's personal history or family.    Objective:   /76 (BP Location: Right arm, Patient Position: Sitting, BP Method: Medium (Automatic))   Ht 5' 4" (1.626 m)   Wt 71 kg (156 lb 8.4 oz)   LMP 2024 (Exact Date)   BMI 26.87 kg/m²     Physical Exam  deferred    Ultrasound performed. See viewpoint for full ultrasound report.  A pritchard living IUP is identified.   Biometry is consistent with EGA.   A large cystic hygroma is seen. Additionally, subcutaneous edema is seen in head and truncal area.  Midline views of the brain appear normal. Unable to obtain appropriate views of the face and heart on today's scan.    Significant labs/imaging:  NIPT - T13 POSITIVE    ASSESSMENT/PLAN:     38 y.o.  female with IUP at 12w1d     Maternal care for (suspected) chromosomal abnormality in fetus, trisomy 13, fetus 1  Please see original consult by our genetic counselor for full counseling.    Patient had an abnormal NIPT for Trisomy 13 positive (PPV 25.3%). She was previously counseled on significance of this testing and options for testing moving forward.  It had been decided to obtain an early anatomy today to see if any abnormalities were seen based on this NIPT.  We discussed today's ultrasound.    We discussed that a cystic hygroma is caused by an abnormality in the development of the connection between lymphatic drainage system and jugular veins early in pregnancy. A cystic hygroma can be caused by chromosome abnormalities, genetic syndromes, or can occur in isolation. About one-third of euploid fetuses with first trimester septated cystic hygromas have major structural anomalies. It can also be associated with congenital heart defects, diaphragmatic hernia, renal anomalies, body stalk disruption, and abdominal wall defects. Approximately 50-60% of fetuses with a cystic hygroma has " a chromosome abnormality. Over time, skin edema and hydrops commonly develop. Chromosomes and aneuploidy were discussed in detail. Christianson syndrome, Down syndrome and trisomy 18 were discussed as the most common chromosome abnormalities associated with cystic hygroma. However, other genetic syndromes such as multiple pterygium, Debbie, and Iraheta syndrome are possible. Normal pediatric outcomes occur in 15 to 30% of fetuses with first trimester cystic hygromas, but the prognosis for a specific fetus depends upon the presence or absence of associated findings. In the absence of aneuploidy or structural anomalies, the size of the first trimester cystic hygroma appears to be the most important determinant of outcome. The prognosis is less favorable with a large cystic hygroma compared with a small cystic hygroma or enlarged nuchal translucency.   If the fetus does not have a chromosome abnormality, a thorough evaluation of the fetus after delivery is recommended to aid in clarification of recurrence risks.    We also discussed the generalized fetal subcutaneous edema that was seen and how this may be associated with progression from the cystic hygroma finding. The finding of generalized subcutaneous edema on the first exam likely raises the risk for a fetal chromosome abnormality or structural abnormality modestly.   We discussed how today's ultrasound increases the concern based on the NIPT findings.  Patient was made aware of the risk for SAB based on today's ultrasound findings.    We again reviewed the testing options available prenatally for further assessment for chromosomal abnormalities and structural anomalies.  The diagnostic testing options of CVS and amniocentesis, with a review of the risks, benefits, and limitations of the procedures, were discussed. A procedure-associated pregnancy loss rate of 1:800 for CVS and 1:1000 for amniocentesis was quoted.   Turn-around times for test results from  CVS/amniocentesis were reviewed. Pregnancy management options, based on test results, were reviewed non-directively, as were the legal restrictions regarding pregnancy termination in [place]. We discussed options for TAB and R/B of this procedure. Aware it is her choice and we will support her with any decision she makes.  Ultrasound was reviewed as the best option to f/u for progression/regression of the subcutaneous edema and for assessment of fetal structural anomalies and growth disturbances.   She voiced her concern with the possibility of CPMs on CVS testing. We discussed limitations and interpretation of this in the setting of abnormal US findings.  At the end of our discussion, patient opted to have an amniocentesis performed at earliest available.     Recommendations:  Follow up at 15 weeks for amniocentesis - scheduled  Targeted ultrasound is recommended at approximately 19-20 weeks gestation.  Fetal ECHO at 22-24 weeks  Further testing and counseling will be provided based on these results.         Hx of  delivery, currently pregnant  Patient mentioned her prior pregnancy was delivered during the 36th weeks (unsure exact timing) for PPROM.   We did not discuss this in detail today.  Will plan to address at future appointments pending results from amniocentesis.       Patient was counseled that prenatal ultrasound studies have limitations. They do not detect all fetal, genetic, placental, and maternal abnormalities.   Patient's other comorbidites were not addressed in today's visit.        FOLLOW UP:   A follow up ultrasound will be made for around 15 weeks' gestation. A follow up MFM MD visit will be made same day      The patient was given an opportunity to ask questions about the management of her high risk pregnancy problems. She expressed an understanding of and agreement to the above impression and plan. All questions were answered to her satisfaction.    45 minutes of total time spent on  the encounter, which includes face to face time and non-face to face time preparing to see the patient (eg, review of tests), obtaining and/or reviewing separately obtained history, documenting clinical information in the electronic or other health record, independently interpreting results (not separately reported) and communicating results to the patient/family/caregiver, or care coordination (not separately reported).        Arun Zhang MD   Maternal-Fetal Medicine      Electronically Signed by Arun Zhang April 18, 2024

## 2024-05-09 ENCOUNTER — PROCEDURE VISIT (OUTPATIENT)
Dept: MATERNAL FETAL MEDICINE | Facility: CLINIC | Age: 39
End: 2024-05-09
Payer: COMMERCIAL

## 2024-05-09 ENCOUNTER — OFFICE VISIT (OUTPATIENT)
Dept: MATERNAL FETAL MEDICINE | Facility: CLINIC | Age: 39
End: 2024-05-09
Payer: COMMERCIAL

## 2024-05-09 VITALS
BODY MASS INDEX: 28.79 KG/M2 | SYSTOLIC BLOOD PRESSURE: 118 MMHG | WEIGHT: 167.75 LBS | DIASTOLIC BLOOD PRESSURE: 78 MMHG

## 2024-05-09 DIAGNOSIS — O35.11X1 MATERNAL CARE FOR (SUSPECTED) CHROMOSOMAL ABNORMALITY IN FETUS, TRISOMY 13, FETUS 1: ICD-10-CM

## 2024-05-09 DIAGNOSIS — O35.9XX1 FETAL ABNORMALITY AFFECTING MANAGEMENT OF MOTHER, FETUS 1 OF MULTIPLE GESTATION: Primary | ICD-10-CM

## 2024-05-09 DIAGNOSIS — O28.5: ICD-10-CM

## 2024-05-09 LAB
GENETIC COUNSELING?: YES
GENSO SPECIMEN TYPE: NORMAL
MISCELLANEOUS GENETIC TEST NAME: NORMAL
PARTENTAL OR SIBLING TESTING?: NO

## 2024-05-09 PROCEDURE — 76816 OB US FOLLOW-UP PER FETUS: CPT | Mod: S$GLB,,, | Performed by: OBSTETRICS & GYNECOLOGY

## 2024-05-09 PROCEDURE — 99999 PR PBB SHADOW E&M-EST. PATIENT-LVL II: CPT | Mod: PBBFAC,,, | Performed by: OBSTETRICS & GYNECOLOGY

## 2024-05-09 PROCEDURE — 76946 ECHO GUIDE FOR AMNIOCENTESIS: CPT | Mod: S$GLB,,, | Performed by: OBSTETRICS & GYNECOLOGY

## 2024-05-09 PROCEDURE — 1159F MED LIST DOCD IN RCRD: CPT | Mod: CPTII,S$GLB,, | Performed by: OBSTETRICS & GYNECOLOGY

## 2024-05-09 PROCEDURE — 3008F BODY MASS INDEX DOCD: CPT | Mod: CPTII,S$GLB,, | Performed by: OBSTETRICS & GYNECOLOGY

## 2024-05-09 PROCEDURE — 59000 AMNIOCENTESIS DIAGNOSTIC: CPT | Mod: S$GLB,,, | Performed by: OBSTETRICS & GYNECOLOGY

## 2024-05-09 PROCEDURE — 3074F SYST BP LT 130 MM HG: CPT | Mod: CPTII,S$GLB,, | Performed by: OBSTETRICS & GYNECOLOGY

## 2024-05-09 PROCEDURE — 99213 OFFICE O/P EST LOW 20 MIN: CPT | Mod: 25,S$GLB,, | Performed by: OBSTETRICS & GYNECOLOGY

## 2024-05-09 PROCEDURE — 3078F DIAST BP <80 MM HG: CPT | Mod: CPTII,S$GLB,, | Performed by: OBSTETRICS & GYNECOLOGY

## 2024-05-09 RX ORDER — FOLIC ACID 0.8 MG
800 TABLET ORAL DAILY
COMMUNITY

## 2024-05-09 NOTE — ASSESSMENT & PLAN NOTE
See prior notes for full counseling    We reviewed today's ultrasound findings with interval worsening in the appearance of cystic hygroma, concern for significant congenital heart disease. We discussed that risk for T13 based on combination of ultrasound findings and cfDNA results is likely much higher than the quoted PPV of 25.3%.     Patient was counseled on diagnostic testing options including CVS & amniocentesis, risks and benefits of each test. Patient elects to proceed with amniocentesis today. Informed consent was obtained and uncomplicated amniocentesis was performed as detailed in ultrasound report. Sample will be sent for FISH and karyotype, reflex to CMA is normal.     Recommendations:  Amniocentesis performed today  Counseled on options for pregnancy management if results are positive for T13, including expectant management or pregnancy interruption as T13 is on the [place] list of exceptions to  ban.   Further pregnancy management based on results of amniocentesis and patient wishes.   [facility] visit scheduled in 2 weeks to follow up ultrasound and follow up available genetic results.

## 2024-05-09 NOTE — PROGRESS NOTES
Maternal Fetal Medicine follow up consult    SUBJECTIVE:     Grecia Carson is a 38 y.o.  female with IUP at 15w1d who is seen in follow up consultation by MFM.  Pregnancy complications include:   Problem   Maternal Care for (Suspected) Chromosomal Abnormality in Fetus, Trisomy 13, Fetus 1     Previous notes reviewed.   No changes to medical, surgical, family, social, or obstetric history.    Interval history since last MFM visit: doing well today without complaints.     Medications reviewed.    Care team members:  Larissa Hall MD - Primary OB       OBJECTIVE:   /78 (BP Location: Left arm)   Wt 76.1 kg (167 lb 11.6 oz)   LMP 2024 (Exact Date)   BMI 28.79 kg/m²     Physical Exam    Ultrasound performed. See viewpoint for full ultrasound report.  [place] live IUP at 15 1/7 weeks gestation  Interval worsening of cystic hygroma appearance with new development of subcutaneous edema on anterior chest and abdominal wall. No other fluid collections or signs of hydrops fetalis.   Based on today's limited cardiac views, there is concern for congenital heart defect.  Limited views of intracranial anatomy appear normal.  Subjectively reduced amniotic fluid for gestational age  Uncomplicated amniocentesis performed as above       ASSESSMENT/PLAN:     38 y.o.  female with IUP at 15w1d    Maternal care for (suspected) chromosomal abnormality in fetus, trisomy 13, fetus 1  See prior notes for full counseling    We reviewed today's ultrasound findings with interval worsening in the appearance of cystic hygroma, concern for significant congenital heart disease. We discussed that risk for T13 based on combination of ultrasound findings and cfDNA results is likely much higher than the quoted PPV of 25.3%.     Patient was counseled on diagnostic testing options including CVS & amniocentesis, risks and benefits of each test. Patient elects to proceed with amniocentesis today. Informed consent was obtained and  uncomplicated amniocentesis was performed as detailed in ultrasound report. Sample will be sent for FISH and karyotype, reflex to CMA is normal.     Recommendations:  Amniocentesis performed today  Counseled on options for pregnancy management if results are positive for T13, including expectant management or pregnancy interruption as T13 is on the [place] list of exceptions to  ban.   Further pregnancy management based on results of amniocentesis and patient wishes.   RTMD visit scheduled in 2 weeks to follow up ultrasound and follow up available genetic results.       Toni Boucher MD  PGY 7  Maternal Fetal Medicine  [facility]

## 2024-05-10 ENCOUNTER — PATIENT MESSAGE (OUTPATIENT)
Dept: MATERNAL FETAL MEDICINE | Facility: CLINIC | Age: 39
End: 2024-05-10
Payer: COMMERCIAL

## 2024-05-13 ENCOUNTER — PATIENT MESSAGE (OUTPATIENT)
Dept: MATERNAL FETAL MEDICINE | Facility: CLINIC | Age: 39
End: 2024-05-13
Payer: COMMERCIAL

## 2024-05-13 ENCOUNTER — TELEPHONE (OUTPATIENT)
Dept: MATERNAL FETAL MEDICINE | Facility: CLINIC | Age: 39
End: 2024-05-13
Payer: COMMERCIAL

## 2024-05-13 NOTE — TELEPHONE ENCOUNTER
Genetic counselor called to discussed positive Trisomy 13 FISH results from amniocentesis. We discussed that this result does not suggest mosiaicism.     Grecia expressed that she would like to end this pregnancy and this information was relayed to Dr. Zhang.     We discussed that these are preliminary results and that the final result from the chromosome analysis will be available in ~7 days.     Grecia did not have additional questions at this time.     Tricia Barnes CGC

## 2024-05-16 ENCOUNTER — OFFICE VISIT (OUTPATIENT)
Dept: MATERNAL FETAL MEDICINE | Facility: CLINIC | Age: 39
End: 2024-05-16
Payer: COMMERCIAL

## 2024-05-16 ENCOUNTER — PATIENT MESSAGE (OUTPATIENT)
Dept: MATERNAL FETAL MEDICINE | Facility: CLINIC | Age: 39
End: 2024-05-16

## 2024-05-16 ENCOUNTER — PROCEDURE VISIT (OUTPATIENT)
Dept: MATERNAL FETAL MEDICINE | Facility: CLINIC | Age: 39
End: 2024-05-16
Payer: COMMERCIAL

## 2024-05-16 VITALS
BODY MASS INDEX: 29.37 KG/M2 | DIASTOLIC BLOOD PRESSURE: 62 MMHG | WEIGHT: 171.06 LBS | SYSTOLIC BLOOD PRESSURE: 118 MMHG

## 2024-05-16 DIAGNOSIS — O35.11X1 MATERNAL CARE FOR (SUSPECTED) CHROMOSOMAL ABNORMALITY IN FETUS, TRISOMY 13, FETUS 1: Primary | ICD-10-CM

## 2024-05-16 DIAGNOSIS — O35.9XX1 FETAL ABNORMALITY AFFECTING MANAGEMENT OF MOTHER, FETUS 1 OF MULTIPLE GESTATION: ICD-10-CM

## 2024-05-16 DIAGNOSIS — O35.11X1 MATERNAL CARE FOR (SUSPECTED) CHROMOSOMAL ABNORMALITY IN FETUS, TRISOMY 13, FETUS 1: ICD-10-CM

## 2024-05-16 DIAGNOSIS — O36.4XX0 FETAL DEMISE AFFECTING DELIVERY: ICD-10-CM

## 2024-05-16 PROCEDURE — 76815 OB US LIMITED FETUS(S): CPT | Mod: S$GLB,,, | Performed by: OBSTETRICS & GYNECOLOGY

## 2024-05-16 PROCEDURE — 3078F DIAST BP <80 MM HG: CPT | Mod: CPTII,S$GLB,, | Performed by: OBSTETRICS & GYNECOLOGY

## 2024-05-16 PROCEDURE — 99999 PR PBB SHADOW E&M-EST. PATIENT-LVL III: CPT | Mod: PBBFAC,,, | Performed by: OBSTETRICS & GYNECOLOGY

## 2024-05-16 PROCEDURE — 3008F BODY MASS INDEX DOCD: CPT | Mod: CPTII,S$GLB,, | Performed by: OBSTETRICS & GYNECOLOGY

## 2024-05-16 PROCEDURE — 3074F SYST BP LT 130 MM HG: CPT | Mod: CPTII,S$GLB,, | Performed by: OBSTETRICS & GYNECOLOGY

## 2024-05-16 PROCEDURE — 99215 OFFICE O/P EST HI 40 MIN: CPT | Mod: S$GLB,,, | Performed by: OBSTETRICS & GYNECOLOGY

## 2024-05-16 PROCEDURE — 1159F MED LIST DOCD IN RCRD: CPT | Mod: CPTII,S$GLB,, | Performed by: OBSTETRICS & GYNECOLOGY

## 2024-05-16 RX ORDER — ONDANSETRON 4 MG/1
TABLET, ORALLY DISINTEGRATING ORAL
COMMUNITY
Start: 2024-05-12

## 2024-05-16 NOTE — ASSESSMENT & PLAN NOTE
Fetal demise diagnosed by ultrasound today. Exact etiology of this unknown, however high concern for this being related to her recent FISH/NIPT +T13.  I extensively counseled the patient regarding management of her mid trimester pregnancy loss, as well as the workup moving forward. We discussed expectant management, induction of labor and dilation and evacuation, with the latter being consistently recognized as the safest option. Risks, benefits, alternatives were discussed in full with patient including the increased risk of bleeding with an induction and the increased risk of retained placenta after delivery and possible need for D&C after IOL. D&E is not without risk, and we discussed possible adverse outcomes, including organ injury (particularly perforation of the uterus), bleeding requiring blood transfusion and infection, as well as possible scarring and, briefly, anesthesia complications. We also discussed the inability to hold her infant after procedure with a D&E. We discussed the need for cervical preparation via laminaria, to be placed in RYANN the morning of (will confirm with operating physician), in anticipation of procedure.  She voiced her understanding of this and is unsure on how she would like to proceed. She will plan to call back with her decision in the coming days.    We discussed also future workup for the likely diagnosis of T13. Would consider obtaining parental karyotype to ensure this is not due to a balanced translocation as patient would like to know her risk of recurrence based on the diagnosis.      Patient primary provider was made aware of today's finding.    Patient also had questions about timing of her future pregnancies. I encouraged the patient to consider waiting at the very minimum 3-6 months given her current GA and possible risks that may be associated. Nonetheless, I also counseled on the consideration of her age (38 and possible risks with further delay. Ultimately,  decision is up to patient understanding R/B of each.     Recommendations:  Patient called back and would like to proceed with surgical management  Dr. Cruz available on Monday to perform D&E. Will call patient tomorrow to continue counseling and further management.  Case request placed  Consider tissue collection to send along with amnio sample for further testing, if needed.  Consider parental karyotype, although would encouraged a genetic counselor reconsultation to discuss this in the near future.  Preconception consultation with M may be considered as well.

## 2024-05-16 NOTE — PROGRESS NOTES
Maternal Fetal Medicine follow up consult    SUBJECTIVE:     Grecia Carson is a 38 y.o.  female with IUP at 16w1d who is seen in follow up consultation by M.  Pregnancy complications include:   Problem   Maternal Care for (Suspected) Chromosomal Abnormality in Fetus, Trisomy 13, Fetus 1     Previous notes reviewed.   No changes to medical, surgical, family, social, or obstetric history.    Interval history since last MFM visit: doing well today without complaints.     Medications reviewed.    Care team members:  Larissa Hall MD - Primary OB     OBJECTIVE:   /62 (BP Location: Right arm, Patient Position: Sitting)   Wt 77.6 kg (171 lb 1.2 oz)   LMP 2024 (Exact Date)   BMI 29.37 kg/m²     Physical Exam    Ultrasound performed. See viewpoint for full ultrasound report.  Maddox demised fetus.   No fetal heart rate is seen, consistent with a missed AB  Previous findings are once again seen - cystic hygroma, subcutaneous edema, subjectively low AFV.    FISH  Consistent with trisomy 13    ASSESSMENT/PLAN:     38 y.o.  female with IUP at 16w1d    Maternal care for (suspected) chromosomal abnormality in fetus, trisomy 13, fetus 1  Fetal demise diagnosed by ultrasound today. Exact etiology of this unknown, however high concern for this being related to her recent FISH/NIPT +T13.  I extensively counseled the patient regarding management of her mid trimester pregnancy loss, as well as the workup moving forward. We discussed expectant management, induction of labor and dilation and evacuation, with the latter being consistently recognized as the safest option. Risks, benefits, alternatives were discussed in full with patient including the increased risk of bleeding with an induction and the increased risk of retained placenta after delivery and possible need for D&C after IOL. D&E is not without risk, and we discussed possible adverse outcomes, including organ injury (particularly perforation of  the uterus), bleeding requiring blood transfusion and infection, as well as possible scarring and, briefly, anesthesia complications. We also discussed the inability to hold her infant after procedure with a D&E. We discussed the need for cervical preparation via laminaria, to be placed in RYANN the morning of (will confirm with operating physician), in anticipation of procedure.  She voiced her understanding of this and is unsure on how she would like to proceed. She will plan to call back with her decision in the coming days.    We discussed also future workup for the likely diagnosis of T13. Would consider obtaining parental karyotype to ensure this is not due to a balanced translocation as patient would like to know her risk of recurrence based on the diagnosis.      Patient primary provider was made aware of today's finding.    Patient also had questions about timing of her future pregnancies. I encouraged the patient to consider waiting at the very minimum 3-6 months given her current GA and possible risks that may be associated. Nonetheless, I also counseled on the consideration of her age (38 and possible risks with further delay. Ultimately, decision is up to patient understanding R/B of each.     Recommendations:  Patient called back and would like to proceed with surgical management  Dr. Cruz available on Monday to perform D&E. Will call patient tomorrow to continue counseling and further management.  Case request placed  Consider tissue collection to send along with amnio sample for further testing, if needed.  Consider parental karyotype, although would encouraged a genetic counselor reconsultation to discuss this in the near future.  Preconception consultation with [facility] may be considered as well.        FOLLOW UP: No further ultrasounds or visits were scheduled.       The patient was given an opportunity to ask questions about the management of her high risk pregnancy problems. She expressed an  understanding of and agreement to the above impression and plan. All questions were answered to her satisfaction.    55 minutes of total time spent on the encounter, which includes face to face time and non-face to face time preparing to see the patient (eg, review of tests), obtaining and/or reviewing separately obtained history, documenting clinical information in the electronic or other health record, independently interpreting results (not separately reported) and communicating results to the patient/family/caregiver, or care coordination (not separately reported).        Arun Zhang MD   Maternal-Fetal Medicine      Electronically Signed by Arun Zhang May 16, 2024

## 2024-05-17 ENCOUNTER — TELEPHONE (OUTPATIENT)
Dept: OBSTETRICS AND GYNECOLOGY | Facility: CLINIC | Age: 39
End: 2024-05-17
Payer: COMMERCIAL

## 2024-05-17 ENCOUNTER — PATIENT MESSAGE (OUTPATIENT)
Dept: OBSTETRICS AND GYNECOLOGY | Facility: CLINIC | Age: 39
End: 2024-05-17
Payer: COMMERCIAL

## 2024-05-17 NOTE — TELEPHONE ENCOUNTER
ID confirmed     Condolences offered.    I reviewed my role as surgeon for D&E. Reviewed I work with Dr. Zhang.   Pt has missed  at ~16 weeks with FISH showing T13 from amnio.    Discussed options of IOL vs D&E. Reviewed she cannot hold or see her baby after D&E.    Reviewed surgery is same day discharge.   Discussed procedure. She prefers a D&E.    Recommended she present to [facility] on Monday, May 20 at 4 AM. Discussed laminaria will be placed shortly after admission to prepare cervix for D&E surgery at around noon.     Discussed case with Dr. Zhang who would like a tissue sample saved in the event the amniocentesis does not grow cell culture.     NPO at midnight.     Questions answered and condolences again offered.    Emiliano Pritchett

## 2024-05-17 NOTE — TELEPHONE ENCOUNTER
Spoke with patient and she is planning to proceed with D&E. She wants to know final results of the amniocentesis and if there was a balanced translocation. She is worried they will say there isn't enough cells from the amnio to get a results and then if the fetal remains are discarded that it will be too late to send them. Discussed with her I will let the team know her concern and we are hoping it will be back soon but if not they could like keep the remains or send them and cancel it but will reach out to team. Discussed with her to voice her concern on Monday. Discussed even if was concern and we couldn't get results from baby that we could screen her and her husbands chromosomes if needed.

## 2024-05-20 ENCOUNTER — ANESTHESIA (OUTPATIENT)
Dept: OBSTETRICS AND GYNECOLOGY | Facility: OTHER | Age: 39
End: 2024-05-20
Payer: COMMERCIAL

## 2024-05-20 ENCOUNTER — HOSPITAL ENCOUNTER (OUTPATIENT)
Facility: OTHER | Age: 39
Discharge: HOME | End: 2024-05-20
Attending: OBSTETRICS & GYNECOLOGY | Admitting: OBSTETRICS & GYNECOLOGY
Payer: COMMERCIAL

## 2024-05-20 ENCOUNTER — ANESTHESIA EVENT (OUTPATIENT)
Dept: OBSTETRICS AND GYNECOLOGY | Facility: OTHER | Age: 39
End: 2024-05-20
Payer: COMMERCIAL

## 2024-05-20 VITALS
OXYGEN SATURATION: 97 % | RESPIRATION RATE: 15 BRPM | SYSTOLIC BLOOD PRESSURE: 133 MMHG | BODY MASS INDEX: 29.19 KG/M2 | HEART RATE: 63 BPM | DIASTOLIC BLOOD PRESSURE: 77 MMHG | WEIGHT: 171 LBS | HEIGHT: 64 IN | TEMPERATURE: 98 F

## 2024-05-20 DIAGNOSIS — O35.11X1 MATERNAL CARE FOR (SUSPECTED) CHROMOSOMAL ABNORMALITY IN FETUS, TRISOMY 13, FETUS 1: ICD-10-CM

## 2024-05-20 DIAGNOSIS — O03.9 MISCARRIAGE AT 8 TO 28 WEEKS GESTATION: ICD-10-CM

## 2024-05-20 DIAGNOSIS — Z98.890 S/P DILATION AND CURETTAGE: Primary | ICD-10-CM

## 2024-05-20 DIAGNOSIS — Z3A.16 16 WEEKS GESTATION OF PREGNANCY: ICD-10-CM

## 2024-05-20 LAB
ABO + RH BLD: NORMAL
BASOPHILS # BLD AUTO: 0.02 K/UL (ref 0–0.2)
BASOPHILS NFR BLD: 0.3 % (ref 0–1.9)
BLD GP AB SCN CELLS X3 SERPL QL: NORMAL
DIFFERENTIAL METHOD BLD: ABNORMAL
EOSINOPHIL # BLD AUTO: 0.1 K/UL (ref 0–0.5)
EOSINOPHIL NFR BLD: 1.1 % (ref 0–8)
ERYTHROCYTE [DISTWIDTH] IN BLOOD BY AUTOMATED COUNT: 12.6 % (ref 11.5–14.5)
HCT VFR BLD AUTO: 33 % (ref 37–48.5)
HGB BLD-MCNC: 11.2 G/DL (ref 12–16)
IMM GRANULOCYTES # BLD AUTO: 0.09 K/UL (ref 0–0.04)
IMM GRANULOCYTES NFR BLD AUTO: 1.4 % (ref 0–0.5)
LYMPHOCYTES # BLD AUTO: 1.2 K/UL (ref 1–4.8)
LYMPHOCYTES NFR BLD: 19.3 % (ref 18–48)
MCH RBC QN AUTO: 32.1 PG (ref 27–31)
MCHC RBC AUTO-ENTMCNC: 33.9 G/DL (ref 32–36)
MCV RBC AUTO: 95 FL (ref 82–98)
MONOCYTES # BLD AUTO: 0.5 K/UL (ref 0.3–1)
MONOCYTES NFR BLD: 8.2 % (ref 4–15)
NEUTROPHILS # BLD AUTO: 4.3 K/UL (ref 1.8–7.7)
NEUTROPHILS NFR BLD: 69.7 % (ref 38–73)
NRBC BLD-RTO: 0 /100 WBC
PLATELET # BLD AUTO: 174 K/UL (ref 150–450)
PMV BLD AUTO: 10.1 FL (ref 9.2–12.9)
POCT GLUCOSE: 87 MG/DL (ref 70–110)
RBC # BLD AUTO: 3.49 M/UL (ref 4–5.4)
SPECIMEN OUTDATE: NORMAL
WBC # BLD AUTO: 6.23 K/UL (ref 3.9–12.7)

## 2024-05-20 PROCEDURE — 71000039 HC RECOVERY, EACH ADD'L HOUR: Performed by: OBSTETRICS & GYNECOLOGY

## 2024-05-20 PROCEDURE — 99499 UNLISTED E&M SERVICE: CPT | Mod: ,,, | Performed by: OBSTETRICS & GYNECOLOGY

## 2024-05-20 PROCEDURE — 71000033 HC RECOVERY, INTIAL HOUR: Performed by: OBSTETRICS & GYNECOLOGY

## 2024-05-20 PROCEDURE — 99222 1ST HOSP IP/OBS MODERATE 55: CPT | Mod: 57,,, | Performed by: OBSTETRICS & GYNECOLOGY

## 2024-05-20 PROCEDURE — 86850 RBC ANTIBODY SCREEN: CPT

## 2024-05-20 PROCEDURE — 88305 TISSUE EXAM BY PATHOLOGIST: CPT | Mod: 26,,, | Performed by: PATHOLOGY

## 2024-05-20 PROCEDURE — 59821 TREATMENT OF MISCARRIAGE: CPT | Mod: ,,, | Performed by: OBSTETRICS & GYNECOLOGY

## 2024-05-20 PROCEDURE — 25000003 PHARM REV CODE 250

## 2024-05-20 PROCEDURE — 88305 TISSUE EXAM BY PATHOLOGIST: CPT | Performed by: PATHOLOGY

## 2024-05-20 PROCEDURE — 36004720 HC OB OR TIME LEV I - 1ST 15 MIN: Performed by: OBSTETRICS & GYNECOLOGY

## 2024-05-20 PROCEDURE — D9220A PRA ANESTHESIA: Mod: AA,,, | Performed by: ANESTHESIOLOGY

## 2024-05-20 PROCEDURE — 36415 COLL VENOUS BLD VENIPUNCTURE: CPT | Performed by: STUDENT IN AN ORGANIZED HEALTH CARE EDUCATION/TRAINING PROGRAM

## 2024-05-20 PROCEDURE — 25000003 PHARM REV CODE 250: Performed by: STUDENT IN AN ORGANIZED HEALTH CARE EDUCATION/TRAINING PROGRAM

## 2024-05-20 PROCEDURE — 37000009 HC ANESTHESIA EA ADD 15 MINS: Performed by: OBSTETRICS & GYNECOLOGY

## 2024-05-20 PROCEDURE — 63600175 PHARM REV CODE 636 W HCPCS: Performed by: STUDENT IN AN ORGANIZED HEALTH CARE EDUCATION/TRAINING PROGRAM

## 2024-05-20 PROCEDURE — 85025 COMPLETE CBC W/AUTO DIFF WBC: CPT

## 2024-05-20 PROCEDURE — 36004721 HC OB OR TIME LEV I - EA ADD 15 MIN: Performed by: OBSTETRICS & GYNECOLOGY

## 2024-05-20 PROCEDURE — 37000008 HC ANESTHESIA 1ST 15 MINUTES: Performed by: OBSTETRICS & GYNECOLOGY

## 2024-05-20 RX ORDER — FENTANYL CITRATE 50 UG/ML
INJECTION, SOLUTION INTRAMUSCULAR; INTRAVENOUS
Status: DISCONTINUED | OUTPATIENT
Start: 2024-05-20 | End: 2024-05-20

## 2024-05-20 RX ORDER — SODIUM CHLORIDE, SODIUM LACTATE, POTASSIUM CHLORIDE, CALCIUM CHLORIDE 600; 310; 30; 20 MG/100ML; MG/100ML; MG/100ML; MG/100ML
INJECTION, SOLUTION INTRAVENOUS CONTINUOUS
Status: DISCONTINUED | OUTPATIENT
Start: 2024-05-20 | End: 2024-05-20 | Stop reason: HOSPADM

## 2024-05-20 RX ORDER — BUPIVACAINE HYDROCHLORIDE 7.5 MG/ML
INJECTION, SOLUTION EPIDURAL; RETROBULBAR
Status: DISCONTINUED | OUTPATIENT
Start: 2024-05-20 | End: 2024-05-20

## 2024-05-20 RX ORDER — FAMOTIDINE 10 MG/ML
20 INJECTION INTRAVENOUS ONCE
Status: COMPLETED | OUTPATIENT
Start: 2024-05-20 | End: 2024-05-20

## 2024-05-20 RX ORDER — SODIUM CHLORIDE 9 MG/ML
INJECTION, SOLUTION INTRAVENOUS CONTINUOUS
Status: DISCONTINUED | OUTPATIENT
Start: 2024-05-20 | End: 2024-05-20 | Stop reason: HOSPADM

## 2024-05-20 RX ORDER — KETAMINE HYDROCHLORIDE 50 MG/ML
INJECTION, SOLUTION INTRAMUSCULAR; INTRAVENOUS
Status: DISCONTINUED | OUTPATIENT
Start: 2024-05-20 | End: 2024-05-20

## 2024-05-20 RX ORDER — SODIUM CHLORIDE, SODIUM LACTATE, POTASSIUM CHLORIDE, CALCIUM CHLORIDE 600; 310; 30; 20 MG/100ML; MG/100ML; MG/100ML; MG/100ML
INJECTION, SOLUTION INTRAVENOUS CONTINUOUS PRN
Status: DISCONTINUED | OUTPATIENT
Start: 2024-05-20 | End: 2024-05-20

## 2024-05-20 RX ORDER — LIDOCAINE HYDROCHLORIDE AND EPINEPHRINE 10; 10 MG/ML; UG/ML
20 INJECTION, SOLUTION INFILTRATION; PERINEURAL ONCE
Status: COMPLETED | OUTPATIENT
Start: 2024-05-20 | End: 2024-05-20

## 2024-05-20 RX ORDER — FAMOTIDINE 20 MG/1
20 TABLET, FILM COATED ORAL
Status: DISCONTINUED | OUTPATIENT
Start: 2024-05-20 | End: 2024-05-20

## 2024-05-20 RX ORDER — IBUPROFEN 600 MG/1
600 TABLET ORAL EVERY 6 HOURS PRN
Qty: 30 TABLET | Refills: 3 | Status: SHIPPED | OUTPATIENT
Start: 2024-05-20

## 2024-05-20 RX ORDER — ONDANSETRON HYDROCHLORIDE 2 MG/ML
INJECTION, SOLUTION INTRAVENOUS
Status: DISCONTINUED | OUTPATIENT
Start: 2024-05-20 | End: 2024-05-20

## 2024-05-20 RX ORDER — PROPOFOL 10 MG/ML
VIAL (ML) INTRAVENOUS CONTINUOUS PRN
Status: DISCONTINUED | OUTPATIENT
Start: 2024-05-20 | End: 2024-05-20

## 2024-05-20 RX ORDER — SODIUM CITRATE AND CITRIC ACID MONOHYDRATE 334; 500 MG/5ML; MG/5ML
30 SOLUTION ORAL
Status: DISCONTINUED | OUTPATIENT
Start: 2024-05-20 | End: 2024-05-20 | Stop reason: HOSPADM

## 2024-05-20 RX ORDER — MUPIROCIN 20 MG/G
OINTMENT TOPICAL
Status: DISCONTINUED | OUTPATIENT
Start: 2024-05-20 | End: 2024-05-20 | Stop reason: HOSPADM

## 2024-05-20 RX ORDER — ACETAMINOPHEN 500 MG
1000 TABLET ORAL
Status: DISCONTINUED | OUTPATIENT
Start: 2024-05-20 | End: 2024-05-20 | Stop reason: HOSPADM

## 2024-05-20 RX ORDER — DEXAMETHASONE SODIUM PHOSPHATE 4 MG/ML
INJECTION, SOLUTION INTRA-ARTICULAR; INTRALESIONAL; INTRAMUSCULAR; INTRAVENOUS; SOFT TISSUE
Status: DISCONTINUED | OUTPATIENT
Start: 2024-05-20 | End: 2024-05-20

## 2024-05-20 RX ORDER — MIDAZOLAM HYDROCHLORIDE 1 MG/ML
INJECTION INTRAMUSCULAR; INTRAVENOUS
Status: DISCONTINUED | OUTPATIENT
Start: 2024-05-20 | End: 2024-05-20

## 2024-05-20 RX ADMIN — SODIUM CHLORIDE, POTASSIUM CHLORIDE, SODIUM LACTATE AND CALCIUM CHLORIDE: 600; 310; 30; 20 INJECTION, SOLUTION INTRAVENOUS at 08:05

## 2024-05-20 RX ADMIN — DOXYCYCLINE 200 MG: 100 INJECTION, POWDER, LYOPHILIZED, FOR SOLUTION INTRAVENOUS at 10:05

## 2024-05-20 RX ADMIN — PROPOFOL 100 MCG/KG/MIN: 10 INJECTION, EMULSION INTRAVENOUS at 12:05

## 2024-05-20 RX ADMIN — ACETAMINOPHEN 1000 MG: 500 TABLET ORAL at 11:05

## 2024-05-20 RX ADMIN — DEXAMETHASONE SODIUM PHOSPHATE 4 MG: 4 INJECTION, SOLUTION INTRAMUSCULAR; INTRAVENOUS at 12:05

## 2024-05-20 RX ADMIN — MIDAZOLAM HYDROCHLORIDE 2 MG: 1 INJECTION INTRAMUSCULAR; INTRAVENOUS at 12:05

## 2024-05-20 RX ADMIN — KETAMINE HYDROCHLORIDE 20 MG: 50 INJECTION INTRAMUSCULAR; INTRAVENOUS at 12:05

## 2024-05-20 RX ADMIN — SODIUM CITRATE AND CITRIC ACID MONOHYDRATE 30 ML: 500; 334 SOLUTION ORAL at 11:05

## 2024-05-20 RX ADMIN — SODIUM CHLORIDE, POTASSIUM CHLORIDE, SODIUM LACTATE AND CALCIUM CHLORIDE 1000 ML: 600; 310; 30; 20 INJECTION, SOLUTION INTRAVENOUS at 05:05

## 2024-05-20 RX ADMIN — FAMOTIDINE 20 MG: 10 INJECTION, SOLUTION INTRAVENOUS at 11:05

## 2024-05-20 RX ADMIN — BUPIVACAINE HYDROCHLORIDE 1.6 ML: 7.5 INJECTION, SOLUTION EPIDURAL; RETROBULBAR at 12:05

## 2024-05-20 RX ADMIN — FENTANYL CITRATE 10 MCG: 0.05 INJECTION, SOLUTION INTRAMUSCULAR; INTRAVENOUS at 12:05

## 2024-05-20 RX ADMIN — FENTANYL CITRATE 50 MCG: 50 INJECTION, SOLUTION INTRAMUSCULAR; INTRAVENOUS at 12:05

## 2024-05-20 RX ADMIN — FENTANYL CITRATE 50 MCG: 50 INJECTION, SOLUTION INTRAMUSCULAR; INTRAVENOUS at 01:05

## 2024-05-20 RX ADMIN — SODIUM CHLORIDE, SODIUM LACTATE, POTASSIUM CHLORIDE, AND CALCIUM CHLORIDE: 600; 310; 30; 20 INJECTION, SOLUTION INTRAVENOUS at 12:05

## 2024-05-20 RX ADMIN — SODIUM CHLORIDE, POTASSIUM CHLORIDE, SODIUM LACTATE AND CALCIUM CHLORIDE: 600; 310; 30; 20 INJECTION, SOLUTION INTRAVENOUS at 11:05

## 2024-05-20 RX ADMIN — ONDANSETRON HYDROCHLORIDE 4 MG: 2 INJECTION INTRAMUSCULAR; INTRAVENOUS at 12:05

## 2024-05-20 NOTE — PLAN OF CARE
Problem: Adult Inpatient Plan of Care  Goal: Plan of Care Review  2024 by Lidia Buenrostro RN  Outcome: Progressing  2024 0853 by Lidia Buenrostro RN  Outcome: Progressing  Goal: Patient-Specific Goal (Individualized)  2024 by Lidia Buenrostro RN  Outcome: Progressing  2024 0853 by Lidia Buenrostro RN  Reactivated  Goal: Absence of Hospital-Acquired Illness or Injury  2024 by Lidia Buenrostro RN  Outcome: Progressing  2024 0853 by Lidia Buenrostro RN  Reactivated  Goal: Optimal Comfort and Wellbeing  2024 by Lidia Buenrostro RN  Outcome: Progressing  2024 0853 by Lidia Buenrostro RN  Reactivated  Goal: Readiness for Transition of Care  2024 by Lidia Buenrostro RN  Outcome: Progressing  2024 0853 by Lidia Buenrostro RN  Reactivated     Problem:  Fall Injury Risk  Goal: Absence of Fall, Infant Drop and Related Injury  2024 by Lidia Buenrostro RN  Outcome: Progressing  2024 0853 by Lidia Buenrostro RN  Reactivated     Problem: Infection  Goal: Absence of Infection Signs and Symptoms  2024 by Lidia Buenrostro RN  Outcome: Progressing  2024 0853 by Lidia Buenrostro RN  Reactivated     Problem: Anesthesia/Analgesia, Neuraxial  Goal: Safe, Effective Infusion Delivery  2024 by Lidia Buenrostro RN  Outcome: Met  2024 0853 by Lidia Buenrostro RN  Reactivated  Goal: Stable Patient-Fetal Status  2024 by Lidia Buenrostro RN  Outcome: Met  2024 0853 by Lidia Buenrostro RN  Reactivated  Goal: Absence of Infection Signs and Symptoms  2024 by Lidia Buenrostro RN  Outcome: Met  2024 0853 by Lidia Buenrostro RN  Reactivated  Goal: Nausea and Vomiting Relief  2024 1523 by Lidia Buenrostro RN  Outcome: Met  2024 0853 by Lidia Buenrostro RN  Reactivated  Goal: Effective Pain Control  2024 by Lidia Buenrostro RN  Outcome: Met  2024  0853 by Lidia Buenrostro RN  Reactivated  Goal: Effective Oxygenation and Ventilation  5/20/2024 1523 by Lidia Buenrostro RN  Outcome: Met  5/20/2024 0853 by Lidia Buenrostro RN  Reactivated  Goal: Baseline Motor Function Return  5/20/2024 1523 by Lidia Buenrostro RN  Outcome: Met  5/20/2024 0853 by Lidia Buenrostro RN  Reactivated  Goal: Effective Urinary Elimination  5/20/2024 1523 by Lidia Buenrostro RN  Outcome: Met  5/20/2024 0853 by Lidia Buenrostro RN  Reactivated     Problem: Skin Injury Risk Increased  Goal: Skin Health and Integrity  Outcome: Progressing

## 2024-05-20 NOTE — DISCHARGE SUMMARY
Discharge Summary  Obstetrics      Admit Date: 2024    Discharge Date and Time: 2024 5:50 PM    Attending Physician: Emiliano Pritchett MD    Principal Diagnoses: S/P dilation and curettage    Active Hospital Problems    Diagnosis  POA    *s/p D&E [Z98.890]  Not Applicable    Maternal care for (suspected) chromosomal abnormality in fetus, trisomy 13, fetus 1 [O35.11X1]  Yes      Resolved Hospital Problems   No resolved problems to display.       Procedures: Procedure(s) (LRB):  DILATION AND CURETTAGE, UTERUS (N/A)    Discharged Condition: good    Hospital Course:   Grecia Carson is a 38 y.o. y.o.  female who presented on 2024   for above procedures for the treatment of fetal demise  T13. Patient tolerated procedure. Post-operative course was uncomplicated.  On day of discharge, patient was urinating, ambulating, and tolerating a regular diet without difficulty. Pain was well controlled on PO medication. She was discharged home on POD#0 in stable condition with instructions to follow up with Dr. Hall in 2 weeks.     Consults: None    Significant Diagnostic Studies:  Recent Labs   Lab 24  0428   WBC 6.23   HGB 11.2*   HCT 33.0*   MCV 95           Treatments:   1. Surgery as above    Disposition: Home or Self Care    Patient Instructions:   Current Discharge Medication List        START taking these medications    Details   ibuprofen (ADVIL,MOTRIN) 600 MG tablet Take 1 tablet (600 mg total) by mouth every 6 (six) hours as needed for Pain.  Qty: 30 tablet, Refills: 3           CONTINUE these medications which have NOT CHANGED    Details   folic acid (FOLVITE) 800 MCG Tab Take 800 mcg by mouth once daily.      ondansetron (ZOFRAN-ODT) 4 MG TbDL Take by mouth.      prenatal vit no.124/iron/folic (PRENATAL VITAMIN ORAL) Take 1 Dose by mouth Daily.      WESTAB PLUS 27 mg iron- 1 mg Tab Take 1 tablet by mouth once daily.             Discharge Procedure Orders   Diet Adult Regular     No  driving until:   Order Comments: No driving until not taking narcotic pain medication.     Pelvic Rest   Order Comments: Pelvic rest until 6 weeks after discharge. Nothing in vagina -no sex, tampons, douching, etc.     Notify your health care provider if you experience any of the following:  temperature >100.4     Notify your health care provider if you experience any of the following:  persistent nausea and vomiting or diarrhea     Notify your health care provider if you experience any of the following:  severe uncontrolled pain     Notify your health care provider if you experience any of the following:  difficulty breathing or increased cough     Notify your health care provider if you experience any of the following:  severe persistent headache     Notify your health care provider if you experience any of the following:  worsening rash     Notify your health care provider if you experience any of the following:  persistent dizziness, light-headedness, or visual disturbances     Notify your health care provider if you experience any of the following:  increased confusion or weakness     Notify your health care provider if you experience any of the following:   Order Comments: Heavy vaginal bleeding saturating more than 1 pad per hr for at least consecutive 2 hrs.     Activity as tolerated        Follow-up Information       Larissa Hall MD. Schedule an appointment as soon as possible for a visit in 2 week(s).    Specialty: Obstetrics and Gynecology  Why: Postoperative visit, Follow up  Contact information:  [address] [id number] [phone]                             Vanessa Herrera MD/MPH  OB/GYN PGY4

## 2024-05-20 NOTE — ANESTHESIA PROCEDURE NOTES
Spinal    Diagnosis: trisomy 13  Patient location during procedure: OR  Start time: 5/20/2024 12:15 PM  Timeout: 5/20/2024 12:15 PM  End time: 5/20/2024 12:20 PM    Staffing  Authorizing Provider: Adenike Holt MD  Performing Provider: Sandra Lemos MD    Staffing  Performed by: Sandra Lemos MD  Authorized by: Adenike Holt MD    Preanesthetic Checklist  Completed: patient identified, IV checked, site marked, risks and benefits discussed, surgical consent, monitors and equipment checked, pre-op evaluation and timeout performed  Spinal Block  Patient position: sitting  Prep: ChloraPrep  Patient monitoring: heart rate, continuous pulse ox and frequent blood pressure checks  Approach: midline  Location: L3-4  Injection technique: single shot  CSF Fluid: clear free-flowing CSF  Needle  Needle type: pencil-tip   Needle gauge: 25 G  Needle length: 3.5 in  Additional Documentation: incremental injection, negative aspiration for heme and no paresthesia on injection  Needle localization: anatomical landmarks

## 2024-05-20 NOTE — ANESTHESIA PREPROCEDURE EVALUATION
[facility]  Anesthesia Pre-Operative Evaluation         Patient Name: Grecia Carson  YOB: 1985  MRN: [id number]    2024      Grecia Carson is a 38 y.o. female  @ 16w5d who presents for scheduled D/C.  IUP is complicated by fetal demise diagnosed at 16wk,  AMA, depression, h/o PTD (36w).     She otherwise has no noted bleeding/clotting disorders, significant cardiopulmonary disease, or spinal pathology.    OB History    Para Term  AB Living   2 1 0 1   1   SAB IAB Ectopic Multiple Live Births           1      # Outcome Date GA Lbr Rohit/2nd Weight Sex Type Anes PTL Lv   2 Current            1  14 36w0d  3.572 kg (7 lb 14 oz) F Vag-Spont   OLENA       Review of patient's allergies indicates:  No Known Allergies    Wt Readings from Last 1 Encounters:   24 0448 77.6 kg (171 lb)       BP Readings from Last 3 Encounters:   24 136/84   24 118/62   24 118/78       Patient Active Problem List   Diagnosis    Fibrocystic breast    Current moderate episode of major depressive disorder without prior episode    Multigravida of advanced maternal age in first trimester    Maternal care for (suspected) chromosomal abnormality in fetus, trisomy 13, fetus 1    Hx of  delivery, currently pregnant       Past Surgical History:   Procedure Laterality Date    CATARACT EXTRACTION, BILATERAL  2017    EYE SURGERY  2018    Cataract    VAGINAL DELIVERY         Social History     Socioeconomic History    Marital status: Significant Other   Tobacco Use    Smoking status: Never    Smokeless tobacco: Never   Substance and Sexual Activity    Alcohol use: Not Currently     Alcohol/week: 1.0 standard drink of alcohol     Types: 1 Cans of beer per week    Drug use: No    Sexual activity: Yes     Partners: Male     Birth control/protection: Other-see comments     Comment: Paraguard         Chemistry        Component Value Date/Time      "02/08/2024 0832    K 4.8 02/08/2024 0832     02/08/2024 0832    CO2 20 (L) 02/08/2024 0832    BUN 10 02/08/2024 0832    CREATININE 0.7 02/08/2024 0832    GLU 60 (L) 02/08/2024 0832        Component Value Date/Time    CALCIUM 9.7 02/08/2024 0832    ALKPHOS 36 (L) 02/08/2024 0832    AST 29 02/08/2024 0832    ALT 23 02/08/2024 0832    BILITOT 0.7 02/08/2024 0832    ESTGFRAFRICA >60 06/22/2022 0705    EGFRNONAA >60 06/22/2022 0705            Lab Results   Component Value Date    WBC 6.23 05/20/2024    HGB 11.2 (L) 05/20/2024    HCT 33.0 (L) 05/20/2024    MCV 95 05/20/2024     05/20/2024       No results for input(s): "PT", "INR", "PROTIME", "APTT" in the last 72 hours.          Pre-op Assessment    I have reviewed the Patient Summary Reports.     I have reviewed the Nursing Notes.    I have reviewed the Medications.     Review of Systems  Anesthesia Hx:  No problems with previous Anesthesia   Neg history of prior surgery.          Denies Family Hx of Anesthesia complications.     Social:  No Alcohol Use, Non-Smoker       Hematology/Oncology:  Hematology Normal   Oncology Normal                                   EENT/Dental:  EENT/Dental Normal           Cardiovascular:  Cardiovascular Normal     Denies Hypertension.       Denies Angina.                                  Pulmonary:  Pulmonary Normal   Denies COPD.  Denies Asthma.                    Renal/:  Renal/ Normal  Denies Chronic Renal Disease.                Hepatic/GI:  Hepatic/GI Normal     Denies Liver Disease.            Musculoskeletal:  Musculoskeletal Normal                Neurological:  Neurology Normal   Denies CVA.    Denies Seizures.                                Endocrine:  Endocrine Normal Denies Diabetes.               Physical Exam  General: Alert and Oriented    Airway:  Mallampati: II   Mouth Opening: Normal  TM Distance: Normal  Tongue: Normal  Neck ROM: Normal ROM    Anesthesia Plan  Type of Anesthesia, risks & benefits " discussed:    Anesthesia Type: Epidural, Spinal, CSE  Intra-op Monitoring Plan: Standard ASA Monitors  Post Op Pain Control Plan: epidural analgesia, intrathecal opioid and multimodal analgesia  Informed Consent: Informed consent signed with the Patient and all parties understand the risks and agree with anesthesia plan.  All questions answered.   ASA Score: 3  Day of Surgery Review of History & Physical: H&P Update referred to the surgeon/provider.    Ready For Surgery From Anesthesia Perspective.   .

## 2024-05-20 NOTE — LETTER
May 20, 2024         [address]  [place] [address]  Phone: [phone]       Patient: Grecia Carson   YOB: 1985  Date of Visit: 05/20/2024    To Whom It May Concern:    Ailyn Carson  was at [facility] on 05/20/2024. The patient may return to work/school on 5/27/24 with no restrictions. If you have any questions or concerns, or if I can be of further assistance, please do not hesitate to contact me.    Sincerely,      Vanessa Herrera MD

## 2024-05-20 NOTE — LETTER
May 20, 2024         [address]  [place] [address]  Phone: [phone]       Patient: Grecia Carson   YOB: 1985  Date of Visit: 05/20/2024    To Whom It May Concern:    Ailyn Carson  was at [facility] on 05/20/2024. The patient may return to work/school on 5/22/24  with no restrictions. If you have any questions or concerns, or if I can be of further assistance, please do not hesitate to contact me.    Sincerely,      Vanessa Herrera MD

## 2024-05-20 NOTE — PLAN OF CARE
Problem: Adult Inpatient Plan of Care  Goal: Plan of Care Review  Outcome: Progressing  Goal: Patient-Specific Goal (Individualized)  Reactivated  Goal: Absence of Hospital-Acquired Illness or Injury  Reactivated  Goal: Optimal Comfort and Wellbeing  Reactivated  Goal: Readiness for Transition of Care  Reactivated     Problem:  Fall Injury Risk  Goal: Absence of Fall, Infant Drop and Related Injury  Reactivated     Problem: Infection  Goal: Absence of Infection Signs and Symptoms  Reactivated     Problem: Anesthesia/Analgesia, Neuraxial  Goal: Safe, Effective Infusion Delivery  Reactivated  Goal: Stable Patient-Fetal Status  Reactivated  Goal: Absence of Infection Signs and Symptoms  Reactivated  Goal: Nausea and Vomiting Relief  Reactivated  Goal: Effective Pain Control  Reactivated  Goal: Effective Oxygenation and Ventilation  Reactivated  Goal: Baseline Motor Function Return  Reactivated  Goal: Effective Urinary Elimination  Reactivated

## 2024-05-20 NOTE — H&P
HISTORY AND PHYSICAL                                                OBSTETRICS          Subjective:       Grecia Carson is a 38 y.o.  female with IUP at 16w5d weeks gestation who presents for scheduled D&E. Patient has missed  at ~16wga with FISH showing T13 from amnio. Patient was previously counseled by Dr. Pritchett and Dr. Zhang regarding diagnosis and options for IOL vs. D&E, and patient prefers to proceed with D&E. Patient has been NPO since MN, and presents this morning for laminaria placement with plan for procedure at around noon.    This IUP is complicated by fetal demise diagnosed at 16w with FISH/NIPT +T13, AMA, depression, h/o PTD (36w).    Review of Systems   Constitutional:  Negative for chills and fever.   Respiratory:  Negative for cough and shortness of breath.    Cardiovascular:  Negative for chest pain.   Gastrointestinal:  Negative for abdominal pain, constipation, diarrhea, nausea and vomiting.   Genitourinary:  Negative for bladder incontinence, pelvic pain, vaginal bleeding, vaginal discharge and vaginal odor.   Integumentary:  Negative for rash.   Psychiatric/Behavioral:  Negative for depression. The patient is not nervous/anxious.        PMHx:   Past Medical History:   Diagnosis Date    Encounter for insertion of ParaGard IUD 2018    @ [facility], per pt        PSHx:   Past Surgical History:   Procedure Laterality Date    CATARACT EXTRACTION, BILATERAL  2017    EYE SURGERY  2018    Cataract    VAGINAL DELIVERY         All: Review of patient's allergies indicates:  No Known Allergies    Meds:   Medications Prior to Admission   Medication Sig Dispense Refill Last Dose    folic acid (FOLVITE) 800 MCG Tab Take 800 mcg by mouth once daily.       ondansetron (ZOFRAN-ODT) 4 MG TbDL Take by mouth.       prenatal vit no.124/iron/folic (PRENATAL VITAMIN ORAL) Take 1 Dose by mouth Daily.       WESTAB PLUS 27 mg iron- 1 mg Tab Take 1 tablet by mouth once daily.          SH:   Social History      Socioeconomic History    Marital status: Significant Other   Tobacco Use    Smoking status: Never    Smokeless tobacco: Never   Substance and Sexual Activity    Alcohol use: Not Currently     Alcohol/week: 1.0 standard drink of alcohol     Types: 1 Cans of beer per week    Drug use: No    Sexual activity: Yes     Partners: Male     Birth control/protection: Other-see comments     Comment: Paraguard       FH:   Family History   Problem Relation Name Age of Onset    Hypertension Mother      No Known Problems Father      Breast cancer Neg Hx      Colon cancer Neg Hx      Ovarian cancer Neg Hx         OBHx:   OB History    Para Term  AB Living   2 1 0 1 0 1   SAB IAB Ectopic Multiple Live Births   0 0 0 0 1      # Outcome Date GA Lbr Rohit/2nd Weight Sex Type Anes PTL Lv   2 Current            1  14 36w0d  3.572 kg (7 lb 14 oz) F Vag-Spont   OLENA      Name: Brina        Objective:       LMP 2024 (Exact Date)     There were no vitals filed for this visit.    General: NAD, alert, oriented, cooperative  HEENT: NCAT, EOM grossly intact  Lungs: normal WOB  Heart: regular rate  Abdomen: gravid uterus, soft, nontender, no rebound or guarding  Extremities: no edema     SSE: 1cm visually, 5 laminaria placed      2024  OB/GYN PROCEDURE (VIEWPOINT)  Indication   ========   Indication: Encounter for Fetal Viability   Indication: Chromosomal Abnormalities     History   ======   General History   Blood group: A   Rhesus: Rh positive   Height 163 cm   Height (ft) 5 ft   Height (in) 4 in   Previous Outcomes    2   Para 1   Risk Factors   History risk factors: Advanced maternal age   History risk factors:  delivery in previous pregnancy   Details: 36 wks     Current Pregnancy   ==============   Maternal Lab Tests   Test: Cell Free DNA Testing   Result of other maternal screening test: Positive T13     Maternal Assessment   =================   Height 163 cm   Height (ft) 5 ft    Height (in) 4 in   Physical Exam   Initial weight 73 kg   Initial weight (lb) 161 lb   Initial BMI 27.62 kg/mÂ²     Method   ======   Transabdominal ultrasound examination, 2D Color Doppler, [place]on E10. View: Sufficient     Pregnancy   =========   Maddox pregnancy. Number of fetuses: 1     Dating   ======   LMP on: 1/26/2024   GA by LMP 15 w + 6 d   KAT by LMP: 11/1/2024   Assigned: based on ultrasound (CRL), selected on 03/26/2024   Assigned GA 16 w + 1 d   Assigned KAT: 10/30/2024     General Evaluation   ==============   Cardiac activity absent. Fetal movements: absent     Impression   =========   M[place]ox demised fetus.   No fetal heart rate is seen, consistent with a missed AB   Previous findings are once again seen - cystic hygroma, subcutaneous edema, subjectively low AFV.     Recommendation   ==============   Please see EPIC for full consult and recommendations.     Limitations   =========   Please note: Prenatal ultrasound studies have limitations. They do not detect all fetal, genetic, placental, and maternal abnormalities. A normal appearing prenatal   ultrasound is reassuring. However, it does not guarantee the absence of an abnormality or predict a normal outcome for the fetus or the mother.                                                             Assessment:       16w5d weeks gestation with:    Active Hospital Problems    Diagnosis  POA    *Maternal care for (suspected) chromosomal abnormality in fetus, trisomy 13, fetus 1 [O35.11X1]  Yes      Resolved Hospital Problems   No resolved problems to display.          Plan:     Fetal demise, FISH +T13  - Consents reviewed, signed and to chart  - Anesthesia to bedside to review pain management plans  - Draw CBC, T&S  - Notify Staff  - Plan for tissue sample collection per Dr. Zhang d/t possible insufficient tissue growth from amnio         Nanci Brown MD   OB/GYN PGY-2

## 2024-05-20 NOTE — TRANSFER OF CARE
"Anesthesia Transfer of Care Note    Patient: Grecia Carson    Procedure(s) Performed: Procedure(s) (LRB):  DILATION AND CURETTAGE, UTERUS (N/A)    Patient location: Labor and Delivery    Anesthesia Type: MAC    Transport from OR: Transported from OR on 6-10 L/min O2 by face mask with adequate spontaneous ventilation    Post pain: adequate analgesia    Post assessment: no apparent anesthetic complications and tolerated procedure well    Post vital signs: stable    Level of consciousness: awake, alert and oriented    Nausea/Vomiting: no nausea/vomiting    Complications: none    Transfer of care protocol was followed      Last vitals: Visit Vitals  /67   Pulse 75   Temp 37 °C (98.6 °F) (Oral)   Resp 17   Ht 5' 4" (1.626 m)   Wt 77.6 kg (171 lb)   LMP 01/26/2024 (Exact Date)   SpO2 99%   Breastfeeding No   BMI 29.35 kg/m²     "

## 2024-05-20 NOTE — OP NOTE
[facility] - Labor & Delivery  OBGYN  Operative Note    SUMMARY     Date of Procedure: 5/20/2024     Procedure: Dilation and Evacuation, Suction Dilation and Curettage  all under US guidance     Surgeons and Role:     * Emiliano Pritchett MD - Primary     * Martine Santos MD - Assisting     * Vanessa Herrera MD - Resident - Assisting    Pre-Operative Diagnosis: Fetal demise affecting delivery [O36.4XX0]  16 week gestation of pregnancy  Trisomy 13 on FISH    Post-Operative Diagnosis: same    Anesthesia: Choice    Antibiotics: Doxycycline 200mg IVPB x1 dose    Technical Procedures Used: Cervical preparation with 5 laminaria prior to start of procedure. Dilation and evacuation, suction dilation and curettage, all under ultrasound guidance    Description of the Findings of the Procedure: .   The patient was taken to the operating room. Time out was performed. SCD's placed and cycling. She was then prepped and draped in the normal sterile fashion in candy cane stirrups in the dorsal lithotomy position. Bimanual exam was performed and 5 laminaria were removed from cervix intact. She was found to be 1 cm dilated. A graves speculum was placed into the vagina and used to visualize cervix. An atraumatic tenaculum was placed on anterior cervix. Paracervical black of 1% lidocaine with epinephrine placed at 5 and 7 o'clock for a total of ~10 cc. Fransisco dilators were then used to dilate her cervix to a number 18. The number 16 suction currette was introduced and amniotomy was made and some products removed. Sopher forceps were then introduced and fetal parts were removed over several passes.  The number 16 suction currette was used again and moderate products of conception were visualized going through the tubing. Suction currettage were performed back and forth about 3-4 times until a gritty consistency was felt on the endometrium and the bleeding subsided. Ultrasound showed a thin endometrial stripe at the conclusion of the  procedure. The patient had no active bleeding at the end of the procedure. The atramatic tenaculum was removed. The speculum was removed from the vagina. No cervical or vaginal lacerations were noted. A straight catheter was used to drain her bladder. The patient tolerated the procedure well. Sponge, lap and needle counts were correct x 2.     Operative Findings: Fetus in breech presentation with no heartones; all fetal parts were accounted for post procedure    Complications: No    Estimated Blood Loss (EBL): 100cc    Specimens: Products of conception           Condition: Good    Disposition: PACU - hemodynamically stable.    Vanessa Herrera MD/MPH  OB/GYN PGY4    I was present and scrubbed for the surgery as listed above. I agree with the contents of the operative report documented above.     Emiliano Pritchett  5/20/2024

## 2024-05-20 NOTE — DISCHARGE INSTRUCTIONS
Labor and Delivery [phone] (option 1)   Call with questions or concerns    Come back to [facility]:   - Headache that won't go away with taking medicine  - Saturating a pad within 1-2 hours.

## 2024-05-21 ENCOUNTER — TELEPHONE (OUTPATIENT)
Dept: OBSTETRICS AND GYNECOLOGY | Facility: CLINIC | Age: 39
End: 2024-05-21
Payer: COMMERCIAL

## 2024-05-21 NOTE — L&D DELIVERY NOTE
Date of Procedure: 5/20/2024      Procedure: Dilation and Evacuation, Suction Dilation and Curettage  all under US guidance      Surgeons and Role:     * Emiliano Pritchett MD - Primary     * Martine Santos MD - Assisting     * Vanessa Herrera MD - Resident - Assisting     Pre-Operative Diagnosis: Fetal demise affecting delivery [O36.4XX0]  16 week gestation of pregnancy  Trisomy 13 on FISH     Post-Operative Diagnosis: same     Anesthesia: Choice     Antibiotics: Doxycycline 200mg IVPB x1 dose     Technical Procedures Used: Cervical preparation with 5 laminaria prior to start of procedure. Dilation and evacuation, suction dilation and curettage, all under ultrasound guidance     Description of the Findings of the Procedure: .   The patient was taken to the operating room. Time out was performed. SCD's placed and cycling. She was then prepped and draped in the normal sterile fashion in candy cane stirrups in the dorsal lithotomy position. Bimanual exam was performed and 5 laminaria were removed from cervix intact. She was found to be 1 cm dilated. A graves speculum was placed into the vagina and used to visualize cervix. An atraumatic tenaculum was placed on anterior cervix. Paracervical black of 1% lidocaine with epinephrine placed at 5 and 7 o'clock for a total of ~10 cc. Fransisco dilators were then used to dilate her cervix to a number 18. The number 16 suction currette was introduced and amniotomy was made and some products removed. Sopher forceps were then introduced and fetal parts were removed over several passes.  The number 16 suction currette was used again and moderate products of conception were visualized going through the tubing. Suction currettage were performed back and forth about 3-4 times until a gritty consistency was felt on the endometrium and the bleeding subsided. Ultrasound showed a thin endometrial stripe at the conclusion of the procedure. The patient had no active bleeding at the end  of the procedure. The atramatic tenaculum was removed. The speculum was removed from the vagina. No cervical or vaginal lacerations were noted. A straight catheter was used to drain her bladder. The patient tolerated the procedure well. Sponge, lap and needle counts were correct x 2.      Operative Findings: Fetus in breech presentation with no heartones; all fetal parts were accounted for post procedure     Complications: No     Estimated Blood Loss (EBL): 100cc     Specimens: Products of conception           Condition: Good     Disposition: PACU - hemodynamically stable.     Vanessa Herrera MD/MPH  OB/GYN PGY4     I was present and scrubbed for the surgery as listed above. I agree with the contents of the operative report documented above.      Emiliano Pritchett  5/20/2024

## 2024-05-21 NOTE — TELEPHONE ENCOUNTER
----- Message from Miladis Winslow sent at 5/21/2024 12:00 PM CDT -----  Type:  Needs Medical Advice/POST OP     Who Called: pt    Would the patient rather a call back or a response via [facility]? Call   Best Call Back Number: [phone]  Additional Information: pt requesting to have POST OP changed to a thursday only date that can be scheduled.

## 2024-05-21 NOTE — ANESTHESIA POSTPROCEDURE EVALUATION
Anesthesia Post Evaluation    Patient: Grecia Carson    Procedure(s) Performed: Procedure(s) (LRB):  DILATION AND CURETTAGE, UTERUS (N/A)    Final Anesthesia Type: spinal      Patient location during evaluation: PACU  Patient participation: Yes- Able to Participate  Level of consciousness: awake and alert  Post-procedure vital signs: reviewed and stable  Pain management: adequate  Airway patency: patent    PONV status at discharge: No PONV  Anesthetic complications: no      Cardiovascular status: blood pressure returned to baseline  Respiratory status: unassisted  Hydration status: euvolemic  Follow-up not needed.              Vitals Value Taken Time   /77 05/20/24 1550   Temp 36.7 °C (98.1 °F) 05/20/24 1448   Pulse 68 05/20/24 1559   Resp 15 05/20/24 1448   SpO2 98 % 05/20/24 1559   Vitals shown include unfiled device data.      Event Time   Out of Recovery 15:33:00         Pain/Renae Score: Pain Rating Prior to Med Admin: 0 (5/20/2024 11:00 AM)

## 2024-05-22 DIAGNOSIS — O35.11X1 MATERNAL CARE FOR (SUSPECTED) CHROMOSOMAL ABNORMALITY IN FETUS, TRISOMY 13, FETUS 1: Primary | ICD-10-CM

## 2024-05-23 ENCOUNTER — PATIENT MESSAGE (OUTPATIENT)
Dept: OBSTETRICS AND GYNECOLOGY | Facility: CLINIC | Age: 39
End: 2024-05-23
Payer: COMMERCIAL

## 2024-05-24 ENCOUNTER — TELEPHONE (OUTPATIENT)
Dept: MATERNAL FETAL MEDICINE | Facility: CLINIC | Age: 39
End: 2024-05-24
Payer: COMMERCIAL

## 2024-05-24 NOTE — TELEPHONE ENCOUNTER
Genetic counselor (Tricia Barnes) called Grecia with positive Trisomy 13 results from fetal karyotype.     These results show full Trisomy 13 without evidence of mosaicism, not due to a translocation.     We discussed that her risks are increased over her age related risks to ~1% (from 1 in 1800).     There is no reason to proceed with a parental karyotype given these results.     We discussed testing in future pregnancies via NIPT and the option of IVF with PGT. She is interested in this possibility. She will discuss with her primary OB at her upcoming visit.     Tricia Barnes CGC

## 2024-05-28 LAB
FINAL PATHOLOGIC DIAGNOSIS: NORMAL
GROSS: NORMAL
Lab: NORMAL

## 2024-06-07 ENCOUNTER — PATIENT MESSAGE (OUTPATIENT)
Dept: DERMATOLOGY | Facility: CLINIC | Age: 39
End: 2024-06-07
Payer: COMMERCIAL

## 2024-06-10 ENCOUNTER — PATIENT MESSAGE (OUTPATIENT)
Dept: PODIATRY | Facility: CLINIC | Age: 39
End: 2024-06-10
Payer: COMMERCIAL

## 2024-06-13 ENCOUNTER — OFFICE VISIT (OUTPATIENT)
Dept: OBSTETRICS AND GYNECOLOGY | Facility: CLINIC | Age: 39
End: 2024-06-13
Payer: COMMERCIAL

## 2024-06-13 VITALS
DIASTOLIC BLOOD PRESSURE: 77 MMHG | WEIGHT: 175.06 LBS | SYSTOLIC BLOOD PRESSURE: 118 MMHG | BODY MASS INDEX: 30.05 KG/M2

## 2024-06-13 DIAGNOSIS — O03.9 MISCARRIAGE: ICD-10-CM

## 2024-06-13 DIAGNOSIS — Z98.890 S/P DILATION AND CURETTAGE: ICD-10-CM

## 2024-06-13 DIAGNOSIS — R82.90 ABNORMAL URINE ODOR: ICD-10-CM

## 2024-06-13 DIAGNOSIS — Z01.419 ROUTINE GYNECOLOGICAL EXAMINATION: Primary | ICD-10-CM

## 2024-06-13 DIAGNOSIS — N89.8 VAGINAL DISCHARGE: ICD-10-CM

## 2024-06-13 LAB
B-HCG UR QL: NEGATIVE
CTP QC/QA: YES

## 2024-06-13 PROCEDURE — 3074F SYST BP LT 130 MM HG: CPT | Mod: CPTII,S$GLB,, | Performed by: OBSTETRICS & GYNECOLOGY

## 2024-06-13 PROCEDURE — 81514 NFCT DS BV&VAGINITIS DNA ALG: CPT | Performed by: OBSTETRICS & GYNECOLOGY

## 2024-06-13 PROCEDURE — 1159F MED LIST DOCD IN RCRD: CPT | Mod: CPTII,S$GLB,, | Performed by: OBSTETRICS & GYNECOLOGY

## 2024-06-13 PROCEDURE — 87086 URINE CULTURE/COLONY COUNT: CPT | Performed by: OBSTETRICS & GYNECOLOGY

## 2024-06-13 PROCEDURE — 3078F DIAST BP <80 MM HG: CPT | Mod: CPTII,S$GLB,, | Performed by: OBSTETRICS & GYNECOLOGY

## 2024-06-13 PROCEDURE — 99024 POSTOP FOLLOW-UP VISIT: CPT | Mod: S$GLB,,, | Performed by: OBSTETRICS & GYNECOLOGY

## 2024-06-13 PROCEDURE — 99999 PR PBB SHADOW E&M-EST. PATIENT-LVL III: CPT | Mod: PBBFAC,,, | Performed by: OBSTETRICS & GYNECOLOGY

## 2024-06-13 PROCEDURE — 81025 URINE PREGNANCY TEST: CPT | Mod: S$GLB,,, | Performed by: OBSTETRICS & GYNECOLOGY

## 2024-06-13 PROCEDURE — 87088 URINE BACTERIA CULTURE: CPT | Performed by: OBSTETRICS & GYNECOLOGY

## 2024-06-13 NOTE — PROGRESS NOTES
CC: s/p D&E    HPI:   Grecia Carson is a 38 y.o. here for f/u  D&E  on 5/20/24. She reports normal bowel movements and urination. Pain is controlled with OTC medications.  Declines contraception. Would like to get pregnant. She had some odor in the urine and vagina but no significant discharge or pain     Vitals:    06/13/24 1525   BP: 118/77       PHYSICAL EXAM:   APPEARANCE: Well nourished, well developed, in no acute distress.    PELVIC:   VULVA: No lesions. Normal female genitalia.  URETHRAL MEATUS: Normal size and location, no lesions, no prolapse.  URETHRA: No masses, tenderness, prolapse or scarring.  VAGINA: Moist and well rugated, scant white discharge, no significant cystocele or rectocele.  CERVIX: No lesions and discharge.  UTERUS: Normal size, regular shape, mobile, non-tender, bladder base nontender.  ADNEXA: No masses or tenderness.      1. Routine gynecological examination    2. Vaginal discharge    3. Miscarriage    4. Abnormal urine odor          PLAN:   1. Contraception discussed with patient. Patient declines. Going to continue prenatal vitamins.   2. We discussed risks of chromosome abnormalities (reviewed the overall basic risks) and reviewed the information given to her by genetics that no translocation noted. Reviewed that would need IVF with genetic testing to say there is not a chromosome issue but doesn't decrease the risk of all issues. She doesn't want to do Ivf. Took her 6 months to get pregnant so we discussed if not pregnant in 6 months then would recommend work up based on age.

## 2024-06-15 LAB
BACTERIA UR CULT: ABNORMAL
BACTERIAL VAGINOSIS DNA: NEGATIVE
CANDIDA GLABRATA DNA: NEGATIVE
CANDIDA KRUSEI DNA: NEGATIVE
CANDIDA RRNA VAG QL PROBE: NEGATIVE
T VAGINALIS RRNA GENITAL QL PROBE: NEGATIVE

## 2024-06-17 ENCOUNTER — PATIENT MESSAGE (OUTPATIENT)
Dept: OBSTETRICS AND GYNECOLOGY | Facility: HOSPITAL | Age: 39
End: 2024-06-17
Payer: COMMERCIAL

## 2024-06-27 ENCOUNTER — PATIENT MESSAGE (OUTPATIENT)
Dept: PSYCHIATRY | Facility: CLINIC | Age: 39
End: 2024-06-27
Payer: COMMERCIAL

## 2024-08-09 ENCOUNTER — PATIENT MESSAGE (OUTPATIENT)
Dept: PODIATRY | Facility: CLINIC | Age: 39
End: 2024-08-09
Payer: COMMERCIAL

## 2024-08-15 ENCOUNTER — OFFICE VISIT (OUTPATIENT)
Dept: PODIATRY | Facility: CLINIC | Age: 39
End: 2024-08-15
Payer: COMMERCIAL

## 2024-08-15 VITALS
HEART RATE: 78 BPM | SYSTOLIC BLOOD PRESSURE: 115 MMHG | DIASTOLIC BLOOD PRESSURE: 73 MMHG | WEIGHT: 175.06 LBS | BODY MASS INDEX: 29.89 KG/M2 | HEIGHT: 64 IN

## 2024-08-15 DIAGNOSIS — B35.1 ONYCHOMYCOSIS DUE TO DERMATOPHYTE: Primary | ICD-10-CM

## 2024-08-15 DIAGNOSIS — L60.9 DISEASE OF NAIL: ICD-10-CM

## 2024-08-15 PROCEDURE — 3074F SYST BP LT 130 MM HG: CPT | Mod: CPTII,S$GLB,, | Performed by: PODIATRIST

## 2024-08-15 PROCEDURE — 1159F MED LIST DOCD IN RCRD: CPT | Mod: CPTII,S$GLB,, | Performed by: PODIATRIST

## 2024-08-15 PROCEDURE — 99213 OFFICE O/P EST LOW 20 MIN: CPT | Mod: S$GLB,,, | Performed by: PODIATRIST

## 2024-08-15 PROCEDURE — 3008F BODY MASS INDEX DOCD: CPT | Mod: CPTII,S$GLB,, | Performed by: PODIATRIST

## 2024-08-15 PROCEDURE — 3078F DIAST BP <80 MM HG: CPT | Mod: CPTII,S$GLB,, | Performed by: PODIATRIST

## 2024-08-15 PROCEDURE — 99999 PR PBB SHADOW E&M-EST. PATIENT-LVL III: CPT | Mod: PBBFAC,,, | Performed by: PODIATRIST

## 2024-08-15 RX ORDER — CICLOPIROX 80 MG/ML
SOLUTION TOPICAL NIGHTLY
Qty: 6.6 ML | Refills: 11 | Status: SHIPPED | OUTPATIENT
Start: 2024-08-15

## 2024-08-21 NOTE — PROGRESS NOTES
Subjective:      Patient ID: Grecia Carson is a 38 y.o. female.    Chief Complaint:   Fungus and Nail Problem (Bilateral great toenail )    Grecia is a 38 y.o. female who presents to the clinic complaining of thick and discolored toenails on both feet. Grecia is inquiring about treatment options.  Improved with still having issues.  Would like to discuss topical options at this point.    Review of Systems   Constitutional: Negative for chills, decreased appetite, fever and malaise/fatigue.   HENT:  Negative for congestion, hearing loss, nosebleeds and tinnitus.    Eyes:  Negative for double vision, pain, photophobia and visual disturbance.   Cardiovascular:  Negative for chest pain, claudication, cyanosis and leg swelling.   Respiratory:  Negative for cough, hemoptysis, shortness of breath and wheezing.    Endocrine: Negative for cold intolerance and heat intolerance.   Hematologic/Lymphatic: Negative for adenopathy and bleeding problem.   Skin:  Positive for color change and nail changes. Negative for dry skin, itching and suspicious lesions.   Musculoskeletal:  Negative for arthritis, joint pain, myalgias and stiffness.   Gastrointestinal:  Negative for abdominal pain, jaundice, nausea and vomiting.   Genitourinary:  Negative for dysuria, frequency and hematuria.   Neurological:  Negative for difficulty with concentration, loss of balance, numbness, paresthesias and sensory change.   Psychiatric/Behavioral:  Negative for altered mental status, hallucinations and suicidal ideas. The patient is not nervous/anxious.    Allergic/Immunologic: Negative for environmental allergies and persistent infections.           Objective:      Physical Exam  Vitals reviewed.   Constitutional:       Appearance: She is well-developed.   HENT:      Head: Normocephalic and atraumatic.   Cardiovascular:      Pulses:           Dorsalis pedis pulses are 2+ on the right side and 2+ on the left side.        Posterior tibial pulses are 2+ on the  right side and 2+ on the left side.   Pulmonary:      Effort: Pulmonary effort is normal.   Musculoskeletal:         General: Normal range of motion.      Comments: Inspection and palpation of the muscles joints and bones of both lower extremities reveal that muscle strength for the anterior lateral and posterior muscle groups and intrinsic muscle groups of the foot are all 5 over 5 symmetrical.  Ankle subtalar midtarsal and digital joint range of motion are within normal limits, nonpainful, without crepitus or effusion.  Patient exhibits a normal angle and base of gait.  Palpation of the tendons reveal no defects.   Skin:     General: Skin is warm and dry.      Capillary Refill: Capillary refill takes 2 to 3 seconds.      Comments: Skin turgor is normal bilaterally.  Skin texture is well hydrated to both lower extremities.  No lesions or rashes or wounds appreciated bilaterally.  Multiple nails demonstrate thickening, discoloration.   Neurological:      Mental Status: She is alert and oriented to person, place, and time.      Comments: Sharp dull light touch vibratory proprioceptive sensation are intact bilaterally.  Deep tendon reflexes to patellar and Achilles tendon are symmetrical 2 over 4 bilaterally.  No ankle clonus or Babinski reflexes noted bilaterally.  Coordination is normal to both feet and lower extremities.   Psychiatric:         Behavior: Behavior normal.             Assessment:       Encounter Diagnoses   Name Primary?    Onychomycosis due to dermatophyte Yes    Disease of nail      Independent visualization of imaging was performed.  Results were reviewed in detail with patient.       Plan:       Grecia was seen today for fungus and nail problem.    Diagnoses and all orders for this visit:    Onychomycosis due to dermatophyte    Disease of nail    Other orders  -     ciclopirox (PENLAC) 8 % Soln; Apply topically nightly.      I counseled the patient on her conditions, their implications and medical  management.    The nature of the condition, options for management, as well as potential risks and complications were discussed in detail with patient. Patient was amenable to my recommendations and left my office fully informed and will follow up as instructed or sooner if necessary.      [facility]    Follow-up in 3 months.

## 2024-09-26 ENCOUNTER — OFFICE VISIT (OUTPATIENT)
Dept: DERMATOLOGY | Facility: CLINIC | Age: 39
End: 2024-09-26
Payer: COMMERCIAL

## 2024-09-26 DIAGNOSIS — Z12.83 SCREENING EXAM FOR SKIN CANCER: ICD-10-CM

## 2024-09-26 DIAGNOSIS — D22.9 MULTIPLE BENIGN NEVI: ICD-10-CM

## 2024-09-26 DIAGNOSIS — L70.0 ACNE VULGARIS: Primary | ICD-10-CM

## 2024-09-26 PROCEDURE — 99204 OFFICE O/P NEW MOD 45 MIN: CPT | Mod: S$GLB,,, | Performed by: STUDENT IN AN ORGANIZED HEALTH CARE EDUCATION/TRAINING PROGRAM

## 2024-09-26 PROCEDURE — 99999 PR PBB SHADOW E&M-EST. PATIENT-LVL II: CPT | Mod: PBBFAC,,, | Performed by: STUDENT IN AN ORGANIZED HEALTH CARE EDUCATION/TRAINING PROGRAM

## 2024-09-26 PROCEDURE — 1160F RVW MEDS BY RX/DR IN RCRD: CPT | Mod: CPTII,S$GLB,, | Performed by: STUDENT IN AN ORGANIZED HEALTH CARE EDUCATION/TRAINING PROGRAM

## 2024-09-26 PROCEDURE — 1159F MED LIST DOCD IN RCRD: CPT | Mod: CPTII,S$GLB,, | Performed by: STUDENT IN AN ORGANIZED HEALTH CARE EDUCATION/TRAINING PROGRAM

## 2024-09-26 NOTE — PROGRESS NOTES
Subjective:      Patient ID:  Grecia Carson is a 39 y.o. female who presents for   Chief Complaint   Patient presents with    Skin Check     TBSE     Grecia Carson is a 39 y.o. female who presents for: FBSE screening exam for skin cancer.    New patient    The patient has no specific concerns today.  Pt wants to discuss lower facial acne flaring 2 weeks prior to cycles . Not on birth control and trying to get pregnanct    Pertinent history:  History of blistering sunburns: No  History of tanning bed use: No  Family history of melanoma: No  Personal history of mole removal: No  Personal history of skin cancer: No         Review of Systems   Skin:  Positive for activity-related sunscreen use. Negative for daily sunscreen use and recent sunburn.   Hematologic/Lymphatic: Does not bruise/bleed easily.       Objective:   Physical Exam   Constitutional: She appears well-developed and well-nourished. No distress.   Neurological: She is alert and oriented to person, place, and time. She is not disoriented.   Psychiatric: She has a normal mood and affect.   Skin:   Areas Examined (abnormalities noted in diagram):   Scalp / Hair Palpated and Inspected  Head / Face Inspection Performed  Neck Inspection Performed  Chest / Axilla Inspection Performed  Abdomen Inspection Performed  Genitals / Buttocks / Groin Inspection Performed  Back Inspection Performed  RUE Inspected  LUE Inspection Performed  RLE Inspected  LLE Inspection Performed  Nails and Digits Inspection Performed                Diagram Legend     Erythematous scaling macule/papule c/w actinic keratosis       Vascular papule c/w angioma      Pigmented verrucoid papule/plaque c/w seborrheic keratosis      Yellow umbilicated papule c/w sebaceous hyperplasia      Irregularly shaped tan macule c/w lentigo     1-2 mm smooth white papules consistent with Milia      Movable subcutaneous cyst with punctum c/w epidermal inclusion cyst      Subcutaneous movable cyst c/w pilar cyst       Firm pink to brown papule c/w dermatofibroma      Pedunculated fleshy papule(s) c/w skin tag(s)      Evenly pigmented macule c/w junctional nevus     Mildly variegated pigmented, slightly irregular-bordered macule c/w mildly atypical nevus      Flesh colored to evenly pigmented papule c/w intradermal nevus       Pink pearly papule/plaque c/w basal cell carcinoma      Erythematous hyperkeratotic cursted plaque c/w SCC      Surgical scar with no sign of skin cancer recurrence      Open and closed comedones      Inflammatory papules and pustules      Verrucoid papule consistent consistent with wart     Erythematous eczematous patches and plaques     Dystrophic onycholytic nail with subungual debris c/w onychomycosis     Umbilicated papule    Erythematous-base heme-crusted tan verrucoid plaque consistent with inflamed seborrheic keratosis     Erythematous Silvery Scaling Plaque c/w Psoriasis     See annotation      Assessment / Plan:      Acne vulgaris  Mild inflammatory acne lower face (hormonal distribution)  Discussed limitations of acne treatments if pregnant. Spironolactone can be beneficial for hormonal acne but not safe if pregnant  Start azelaic acid cream BID (if Rx not covered can get 10% cream from ordinary brand)    Multiple benign nevi  Reassurance  >50 nevi all medium brown and less 6 mm  I reviewed the ABCDE's of melanoma, ugly duckling sign and importance of monthly self-exams in mirror.     Screening exam for skin cancer  Total body skin examination performed today including at least 12 points as noted in physical examination. No lesions suspicious for malignancy noted.    Recommend daily sun protection/avoidance, use of at least SPF 30, broad spectrum sunscreen (OTC drug), skin self examinations, and routine physician surveillance to optimize early detection    RTC 1 yr, sooner prn

## 2024-10-09 ENCOUNTER — PATIENT MESSAGE (OUTPATIENT)
Dept: OBSTETRICS AND GYNECOLOGY | Facility: CLINIC | Age: 39
End: 2024-10-09
Payer: COMMERCIAL

## 2024-10-09 DIAGNOSIS — N93.9 ABNORMAL UTERINE BLEEDING (AUB): Primary | ICD-10-CM

## 2024-10-09 NOTE — TELEPHONE ENCOUNTER
Called patient and scheduled labs and appointments for ultrasound and virtual.     Pls set up for CBC/TSH blood work and US and visit after to review

## 2024-10-14 ENCOUNTER — HOSPITAL ENCOUNTER (OUTPATIENT)
Dept: RADIOLOGY | Facility: HOSPITAL | Age: 39
Discharge: HOME | End: 2024-10-14
Attending: OBSTETRICS & GYNECOLOGY
Payer: COMMERCIAL

## 2024-10-14 DIAGNOSIS — N93.9 ABNORMAL UTERINE BLEEDING (AUB): ICD-10-CM

## 2024-10-14 PROCEDURE — 76856 US EXAM PELVIC COMPLETE: CPT | Mod: TC

## 2024-10-14 PROCEDURE — 76856 US EXAM PELVIC COMPLETE: CPT | Mod: 26,,, | Performed by: RADIOLOGY

## 2024-10-14 PROCEDURE — 76830 TRANSVAGINAL US NON-OB: CPT | Mod: TC

## 2024-10-14 PROCEDURE — 76830 TRANSVAGINAL US NON-OB: CPT | Mod: 26,,, | Performed by: RADIOLOGY

## 2024-10-15 ENCOUNTER — OFFICE VISIT (OUTPATIENT)
Dept: OBSTETRICS AND GYNECOLOGY | Facility: CLINIC | Age: 39
End: 2024-10-15
Payer: COMMERCIAL

## 2024-10-15 DIAGNOSIS — N93.9 ABNORMAL UTERINE BLEEDING (AUB): Primary | ICD-10-CM

## 2024-10-15 PROCEDURE — 99212 OFFICE O/P EST SF 10 MIN: CPT | Mod: 95,,, | Performed by: OBSTETRICS & GYNECOLOGY

## 2024-10-15 PROCEDURE — 1160F RVW MEDS BY RX/DR IN RCRD: CPT | Mod: CPTII,95,, | Performed by: OBSTETRICS & GYNECOLOGY

## 2024-10-15 PROCEDURE — 1159F MED LIST DOCD IN RCRD: CPT | Mod: CPTII,95,, | Performed by: OBSTETRICS & GYNECOLOGY

## 2024-10-15 RX ORDER — IBUPROFEN 800 MG/1
800 TABLET ORAL EVERY 8 HOURS PRN
Qty: 30 TABLET | Refills: 2 | Status: SHIPPED | OUTPATIENT
Start: 2024-10-15 | End: 2025-10-15

## 2024-10-15 NOTE — PROGRESS NOTES
The patient location is: [place]  The chief complaint leading to consultation is: AUB    Visit type: audiovisual    Face to Face time with patient: 15 minutes of total time spent on the encounter, which includes face to face time and non-face to face time preparing to see the patient (eg, review of tests), Obtaining and/or reviewing separately obtained history, Documenting clinical information in the electronic or other health record, Independently interpreting results (not separately reported) and communicating results to the patient/family/caregiver, or Care coordination (not separately reported).         Each patient to whom he or she provides medical services by telemedicine is:  (1) informed of the relationship between the physician and patient and the respective role of any other health care provider with respect to management of the patient; and (2) notified that he or she may decline to receive medical services by telemedicine and may withdraw from such care at any time.    Notes:     Chief Complaint   Patient presents with    aub       HPI:   Grecia Carson 39 y.o.  is here for AUB, over past few months had small blood clots size of fly. Normally 30 days but last 2 came 24 days apart. And clots were larger about the size of strawberry. Bleeding for 3-4 days. Changing pad every 2 hours.      No LMP recorded.     Past Medical History:   Diagnosis Date    Encounter for insertion of ParaGard IUD 2018    @ [facility], per pt        Past Surgical History:   Procedure Laterality Date    CATARACT EXTRACTION, BILATERAL  2017    DILATION AND CURETTAGE OF UTERUS N/A 5/20/2024    Procedure: DILATION AND CURETTAGE, UTERUS;  Surgeon: Emiliano Pritchett MD;  Location: [facility] L&D;  Service: OB/GYN;  Laterality: N/A;  D&E    EYE SURGERY  2018    Cataract    VAGINAL DELIVERY  2014       Family History   Problem Relation Name Age of Onset    Hypertension Mother      No Known Problems Father      Breast cancer Neg Hx      Colon  cancer Neg Hx      Ovarian cancer Neg Hx         Social History     Socioeconomic History    Marital status: Significant Other   Tobacco Use    Smoking status: Never    Smokeless tobacco: Never   Substance and Sexual Activity    Alcohol use: Not Currently     Alcohol/week: 1.0 standard drink of alcohol     Types: 1 Cans of beer per week    Drug use: No    Sexual activity: Yes     Partners: Male     Birth control/protection: Other-see comments     Comment: Paraguard     Social Drivers of Health     Financial Resource Strain: High Risk (2024)    Overall Financial Resource Strain (CARDIA)     Difficulty of Paying Living Expenses: Hard   Food Insecurity: Food Insecurity Present (2024)    Hunger Vital Sign     Worried About Running Out of Food in the Last Year: Sometimes true     Ran Out of Food in the Last Year: Never true   Physical Activity: Sufficiently Active (2024)    Exercise Vital Sign     Days of Exercise per Week: 5 days     Minutes of Exercise per Session: 120 min   Stress: Stress Concern Present (2024)    [employer] [place] [employer] - Occupational Stress Questionnaire     Feeling of Stress : To some extent   Housing Stability: Unknown (2024)    Housing Stability Vital Sign     Unable to Pay for Housing in the Last Year: No       OB History          2    Para   1    Term   0       1    AB   1    Living   1         SAB   1    IAB        Ectopic        Multiple        Live Births   1                  COMPREHENSIVE GYN HISTORY:  PAP History: Denies abnormal Paps.  Infection History: Denies STDs. Denies PID.  Benign History: Denies uterine fibroids. Denies ovarian cysts. Denies endometriosis.   Cancer History: Denies cervical cancer. Denies uterine cancer or hyperplasia. Denies ovarian cancer. Denies vulvar cancer or pre-cancer. Denies vaginal cancer or pre-cancer. Denies breast cancer. Denies colon cancer.  Sexual Activity History:   reports being sexually  active and has had partner(s) who are male. She reports using the following method of birth control/protection: Other-see comments.         ROS:    All other ROS negative     PE:   There were no vitals taken for this visit.    APPEARANCE: Well nourished, well developed, in no acute distress.    PELVIC: negative         1. Abnormal uterine bleeding (AUB)        Plan:  Discussed choices for AUB. Nothing structural seen on US, labs CBC and TSH are normal. Discussed could be adenomyosis related. Would like to get pregnant so doesn't want to do birth control. Will do ibuprofen with cycle and monitor.

## 2024-11-06 ENCOUNTER — PATIENT MESSAGE (OUTPATIENT)
Dept: OBSTETRICS AND GYNECOLOGY | Facility: CLINIC | Age: 39
End: 2024-11-06
Payer: COMMERCIAL

## 2025-01-28 ENCOUNTER — CLINICAL SUPPORT (OUTPATIENT)
Dept: OBSTETRICS AND GYNECOLOGY | Facility: CLINIC | Age: 40
End: 2025-01-28

## 2025-01-28 ENCOUNTER — PATIENT MESSAGE (OUTPATIENT)
Dept: OBSTETRICS AND GYNECOLOGY | Facility: CLINIC | Age: 40
End: 2025-01-28

## 2025-01-28 DIAGNOSIS — N91.2 AMENORRHEA: Primary | ICD-10-CM

## 2025-01-28 PROCEDURE — 99212 OFFICE O/P EST SF 10 MIN: CPT | Mod: PBBFAC,PO

## 2025-01-28 PROCEDURE — 99999 PR PBB SHADOW E&M-EST. PATIENT-LVL II: CPT | Mod: PBBFAC,,,

## 2025-01-28 NOTE — PROGRESS NOTES
Spoke with patient for a total of 30 minutes during virtual visit.  Updated chart to reflect up to date patient demographics.  Allergies, medications, pharmacy, medical/family history and OB history updated.  Patient was guided through expectations of care during pregnancy.  Pregnancy confirmation, dating u/s & first routine OB appts scheduled.  Education provided & questions answered. Encouraged to send message or call office with any questions/concerns. Verbalized understanding.     Discussed with pt:    Lmp 12/25; currently 4w6d; KAT 10/1/25  taking PNV   C/o occ nausea/no vomiting  C/o mild cramping/no spotting  Precautions discussed  Referred to [email or web address] for Preg A to Z guide & class schedule   Discussed benefits of breastfeeding - plans to breastfeed   Discussed need for pediatrician  MABEL MEYERS x1

## 2025-02-03 ENCOUNTER — PATIENT MESSAGE (OUTPATIENT)
Dept: OBSTETRICS AND GYNECOLOGY | Facility: CLINIC | Age: 40
End: 2025-02-03
Payer: COMMERCIAL

## 2025-02-04 RX ORDER — PNV,CALCIUM 72/IRON/FOLIC ACID 27 MG-1 MG
1 TABLET ORAL DAILY
Qty: 90 TABLET | Refills: 3 | Status: SHIPPED | OUTPATIENT
Start: 2025-02-04 | End: 2025-02-25

## 2025-02-05 ENCOUNTER — TELEPHONE (OUTPATIENT)
Dept: OBSTETRICS AND GYNECOLOGY | Facility: CLINIC | Age: 40
End: 2025-02-05
Payer: COMMERCIAL

## 2025-02-05 NOTE — TELEPHONE ENCOUNTER
Called patient and informed her that Dr. Hall is out of the office and will not return until Monday, patient stated that she would like a prescription for prenatal vitamins. I informed her that she could get some over the counter and we recommend any prenatals as long as they have folic acid in them.     Patient preferred a prescription because her insurance will cover the payment.   I informed her that we will reach out to her provider and to reach out if she has not heard anything by Monday.        ----- Message from Martine sent at 2/4/2025  2:25 PM CST -----  Type:  RX Refill Request    Who Called: pt   Refill or New Rx:refill  RX Name and Strength:WESTAB PLUS 27 mg iron- 1 mg Tab  How is the patient currently taking it? (ex. 1XDay):N/A  Is this a 30 day or 90 day RX:N/A  [facility] with phone number[id number] - [place], [place] - [address] AT [facility] [address]   Phone: [phone]  Fax: [phone]        Local or Mail Order:Local   Ordering Provider:N/A  Would the patient rather a call back or a response via [facility]? Call   Best Call Back Number: [phone]  Additional Information:      Pt stated she would like Dr Hall to fill this prescription

## 2025-02-07 ENCOUNTER — PATIENT MESSAGE (OUTPATIENT)
Facility: CLINIC | Age: 40
End: 2025-02-07

## 2025-02-25 ENCOUNTER — PATIENT MESSAGE (OUTPATIENT)
Dept: OBSTETRICS AND GYNECOLOGY | Facility: CLINIC | Age: 40
End: 2025-02-25

## 2025-02-25 ENCOUNTER — OFFICE VISIT (OUTPATIENT)
Dept: OBSTETRICS AND GYNECOLOGY | Facility: CLINIC | Age: 40
End: 2025-02-25
Payer: COMMERCIAL

## 2025-02-25 ENCOUNTER — PROCEDURE VISIT (OUTPATIENT)
Dept: MATERNAL FETAL MEDICINE | Facility: CLINIC | Age: 40
End: 2025-02-25
Payer: COMMERCIAL

## 2025-02-25 VITALS
HEIGHT: 62 IN | HEART RATE: 81 BPM | BODY MASS INDEX: 35.77 KG/M2 | SYSTOLIC BLOOD PRESSURE: 127 MMHG | WEIGHT: 194.38 LBS | DIASTOLIC BLOOD PRESSURE: 86 MMHG

## 2025-02-25 DIAGNOSIS — Z72.89 OTHER PROBLEMS RELATED TO LIFESTYLE: ICD-10-CM

## 2025-02-25 DIAGNOSIS — O21.9 NAUSEA AND VOMITING DURING PREGNANCY: ICD-10-CM

## 2025-02-25 DIAGNOSIS — O09.521 MULTIGRAVIDA OF ADVANCED MATERNAL AGE IN FIRST TRIMESTER: ICD-10-CM

## 2025-02-25 DIAGNOSIS — Z11.3 SCREENING EXAMINATION FOR STD (SEXUALLY TRANSMITTED DISEASE): ICD-10-CM

## 2025-02-25 DIAGNOSIS — Z3A.09 9 WEEKS GESTATION OF PREGNANCY: ICD-10-CM

## 2025-02-25 DIAGNOSIS — N91.2 AMENORRHEA: ICD-10-CM

## 2025-02-25 DIAGNOSIS — Z34.90 PREGNANCY, UNSPECIFIED GESTATIONAL AGE: Primary | ICD-10-CM

## 2025-02-25 DIAGNOSIS — Z36.0 ENCOUNTER FOR ANTENATAL SCREENING FOR CHROMOSOMAL ANOMALIES: ICD-10-CM

## 2025-02-25 PROBLEM — O35.11X1: Status: RESOLVED | Noted: 2024-04-18 | Resolved: 2025-02-25

## 2025-02-25 PROBLEM — O09.899 HX OF PRETERM DELIVERY, CURRENTLY PREGNANT: Status: RESOLVED | Noted: 2024-04-18 | Resolved: 2025-02-25

## 2025-02-25 PROBLEM — Z3A.16 16 WEEKS GESTATION OF PREGNANCY: Status: RESOLVED | Noted: 2024-05-20 | Resolved: 2025-02-25

## 2025-02-25 PROBLEM — O03.9 MISCARRIAGE AT 8 TO 28 WEEKS GESTATION: Status: RESOLVED | Noted: 2024-05-20 | Resolved: 2025-02-25

## 2025-02-25 LAB
B-HCG UR QL: POSITIVE
CTP QC/QA: YES

## 2025-02-25 PROCEDURE — 81025 URINE PREGNANCY TEST: CPT | Mod: S$GLB,,, | Performed by: OBSTETRICS & GYNECOLOGY

## 2025-02-25 PROCEDURE — 81515 NFCT DS BV&VAGINITIS DNA ALG: CPT | Performed by: OBSTETRICS & GYNECOLOGY

## 2025-02-25 PROCEDURE — 87591 N.GONORRHOEAE DNA AMP PROB: CPT | Performed by: OBSTETRICS & GYNECOLOGY

## 2025-02-25 PROCEDURE — 1159F MED LIST DOCD IN RCRD: CPT | Mod: CPTII,S$GLB,, | Performed by: OBSTETRICS & GYNECOLOGY

## 2025-02-25 PROCEDURE — 3074F SYST BP LT 130 MM HG: CPT | Mod: CPTII,S$GLB,, | Performed by: OBSTETRICS & GYNECOLOGY

## 2025-02-25 PROCEDURE — 76817 TRANSVAGINAL US OBSTETRIC: CPT | Mod: S$GLB,,, | Performed by: STUDENT IN AN ORGANIZED HEALTH CARE EDUCATION/TRAINING PROGRAM

## 2025-02-25 PROCEDURE — 99214 OFFICE O/P EST MOD 30 MIN: CPT | Mod: S$GLB,,, | Performed by: OBSTETRICS & GYNECOLOGY

## 2025-02-25 PROCEDURE — 3008F BODY MASS INDEX DOCD: CPT | Mod: CPTII,S$GLB,, | Performed by: OBSTETRICS & GYNECOLOGY

## 2025-02-25 PROCEDURE — 87088 URINE BACTERIA CULTURE: CPT | Performed by: OBSTETRICS & GYNECOLOGY

## 2025-02-25 PROCEDURE — 99999 PR PBB SHADOW E&M-EST. PATIENT-LVL III: CPT | Mod: PBBFAC,,, | Performed by: OBSTETRICS & GYNECOLOGY

## 2025-02-25 PROCEDURE — 3079F DIAST BP 80-89 MM HG: CPT | Mod: CPTII,S$GLB,, | Performed by: OBSTETRICS & GYNECOLOGY

## 2025-02-25 PROCEDURE — 87086 URINE CULTURE/COLONY COUNT: CPT | Performed by: OBSTETRICS & GYNECOLOGY

## 2025-02-25 PROCEDURE — 76801 OB US < 14 WKS SINGLE FETUS: CPT | Mod: S$GLB,,, | Performed by: STUDENT IN AN ORGANIZED HEALTH CARE EDUCATION/TRAINING PROGRAM

## 2025-02-25 RX ORDER — PRENATAL WITH FERROUS FUM AND FOLIC ACID 3080; 920; 120; 400; 22; 1.84; 3; 20; 10; 1; 12; 200; 27; 25; 2 [IU]/1; [IU]/1; MG/1; [IU]/1; MG/1; MG/1; MG/1; MG/1; MG/1; MG/1; UG/1; MG/1; MG/1; MG/1; MG/1
1 TABLET ORAL DAILY
Qty: 90 TABLET | Refills: 3 | Status: SHIPPED | OUTPATIENT
Start: 2025-02-25 | End: 2026-02-25

## 2025-02-25 RX ORDER — DOXYLAMINE SUCCINATE AND PYRIDOXINE HYDROCHLORIDE, DELAYED RELEASE TABLETS 10 MG/10 MG 10; 10 MG/1; MG/1
2 TABLET, DELAYED RELEASE ORAL NIGHTLY
Qty: 60 TABLET | Refills: 3 | Status: SHIPPED | OUTPATIENT
Start: 2025-02-25

## 2025-02-25 RX ORDER — PROMETHAZINE HYDROCHLORIDE 25 MG/1
25 TABLET ORAL
Qty: 30 TABLET | Refills: 1 | Status: SHIPPED | OUTPATIENT
Start: 2025-02-25 | End: 2025-03-27

## 2025-02-25 RX ORDER — ONDANSETRON 4 MG/1
4 TABLET, ORALLY DISINTEGRATING ORAL EVERY 6 HOURS PRN
Qty: 30 TABLET | Refills: 1 | Status: SHIPPED | OUTPATIENT
Start: 2025-02-25 | End: 2025-03-27

## 2025-02-25 NOTE — PROGRESS NOTES
CC: positive pregnancy test     HPI:   Grecia Carson 39 y.o.  at 8 6/7 wga is here for + UPT. She hasn't seen anyone yet for this pregnancy. She complains of nausea, throwing up occasionally in am.     OB history: 39 wga  delivery and D&C 2/2 SAB trisomy 18      Patient's last menstrual period was 2024 (exact date).     Past Medical History:   Diagnosis Date    Encounter for insertion of ParaGard IUD 2018    @ [facility], per pt        Past Surgical History:   Procedure Laterality Date    CATARACT EXTRACTION, BILATERAL  2017    DILATION AND CURETTAGE OF UTERUS N/A 2024    Procedure: DILATION AND CURETTAGE, UTERUS;  Surgeon: Emiliano Pritchett MD;  [facility]: [facility];  Service: OB/GYN;  Laterality: N/A;  D&E    EYE SURGERY      Cataract    VAGINAL DELIVERY         Family History   Problem Relation Name Age of Onset    Hypertension Mother      No Known Problems Father      Breast cancer Neg Hx      Colon cancer Neg Hx      Ovarian cancer Neg Hx         Social History     Socioeconomic History    Marital status:    Tobacco Use    Smoking status: Never    Smokeless tobacco: Never   Substance and Sexual Activity    Alcohol use: Not Currently     Alcohol/week: 1.0 standard drink of alcohol     Types: 1 Cans of beer per week    Drug use: No    Sexual activity: Yes     Partners: Male     Birth control/protection: Other-see comments     Comment: Paraguard     Social Drivers of Health     Financial Resource Strain: High Risk (2024)    Overall Financial Resource Strain (CARDIA)     Difficulty of Paying Living Expenses: Hard   Food Insecurity: Food Insecurity Present (2024)    Hunger Vital Sign     Worried About Running Out of Food in the Last Year: Sometimes true     Ran Out of Food in the Last Year: Never true   Physical Activity: Sufficiently Active (2024)    Exercise Vital Sign     Days of Exercise per Week: 5 days     Minutes of Exercise per Session: 120 min   Stress: Stress  "Concern Present (2024)    [employer] [employer] - Occupational Stress Questionnaire     Feeling of Stress : To some extent   Housing Stability: Unknown (2024)    Housing Stability Vital Sign     Unable to Pay for Housing in the Last Year: No       OB History          3    Para   1    Term   0       1    AB   1    Living   1         SAB   1    IAB        Ectopic        Multiple        Live Births   1                  COMPREHENSIVE GYN HISTORY:  PAP History: h/o bnormal Pap with colpo about 10 years ago but normal since;  nILM  Infection History: Denies STDs. Denies PID.  Benign History: has 1  uterine fibroids. Denies ovarian cysts. Denies endometriosis.   Cancer History: Denies cervical cancer. Denies uterine cancer or hyperplasia. Denies ovarian cancer. Denies vulvar cancer or pre-cancer. Denies vaginal cancer or pre-cancer. Denies breast cancer. Denies colon cancer.  Sexual Activity History:   reports being sexually active and has had partner(s) who are male. She reports using the following method of birth control/protection: Other-see comments.   Menstrual History: Age of menarche: nl/mon     ROS:  Negative     PE:   /86 (Patient Position: Sitting)   Pulse 81   Ht 5' 2" (1.575 m)   Wt 88.2 kg (194 lb 6.4 oz)   LMP 2024 (Exact Date)   BMI 35.56 kg/m²     APPEARANCE: Well nourished, well developed, in no acute distress.  NECK: Neck symmetric without  thyromegaly.    PELVIC:   VULVA: No lesions. Normal female genitalia.  URETHRAL MEATUS: Normal size and location, no lesions, no prolapse.  URETHRA: No masses, tenderness, prolapse or scarring.  VAGINA: Moist and well rugated, no discharge, no significant cystocele or rectocele.  CERVIX: No lesions and discharge.  UTERUS: 8wk size, regular shape, mobile, non-tender, bladder base nontender.  ADNEXA: No masses or tenderness.        1. Pregnancy, unspecified gestational age        Plan:    1. IOB labs and " dating US ordered, PNV ordered.   2 see back in 4 weeks.   3. AMA: discussed genetic screening and desires MT21, will start ASA when over 12 wga. 32 week growth US   4. Fibroid: small   5. N/V: Discussed with patient lifestyle modifications that can help with N/V such as eating small meals every 1-2 hours to avoid getting over full or too hungry, eliminating spicy and fatty foods, eating high protein snacks and crackers before get out of bed and stopping iron supplements in the first trimester and taking folic acid instead of the PNV if needed. Can try leticia, vitamin B6 25mg +/- 12.5 mg unisome up to three times a day. Discussed that if this isn't effective then I sent in phenergan PO for her to try and if that not effective then sent in Zofran ODT. We discussed the most common side effects including constipation, headaches and drowsiness . We discussed the limited studies with zofran  6. Depression: on zoloft in the past, doesn't feel like she needs it and would prefer to be off.  Doing ok, will let me know if things change   7 h/o trisomy 18     Face to Face time with patient: 30 minutes of total time spent on the encounter, which includes face to face time and non-face to face time preparing to see the patient (eg, review of tests), Obtaining and/or reviewing separately obtained history, Documenting clinical information in the electronic or other health record, Independently interpreting results (not separately reported) and communicating results to the patient/family/caregiver, or Care coordination (not separately reported).

## 2025-02-28 LAB — BACTERIA UR CULT: ABNORMAL

## 2025-03-05 ENCOUNTER — LAB VISIT (OUTPATIENT)
Dept: LAB | Facility: HOSPITAL | Age: 40
End: 2025-03-05
Attending: OBSTETRICS & GYNECOLOGY
Payer: COMMERCIAL

## 2025-03-05 DIAGNOSIS — O09.521 MULTIGRAVIDA OF ADVANCED MATERNAL AGE IN FIRST TRIMESTER: ICD-10-CM

## 2025-03-05 DIAGNOSIS — Z36.0 ENCOUNTER FOR ANTENATAL SCREENING FOR CHROMOSOMAL ANOMALIES: ICD-10-CM

## 2025-03-05 DIAGNOSIS — Z3A.09 9 WEEKS GESTATION OF PREGNANCY: ICD-10-CM

## 2025-03-05 DIAGNOSIS — Z11.3 SCREENING EXAMINATION FOR STD (SEXUALLY TRANSMITTED DISEASE): ICD-10-CM

## 2025-03-05 DIAGNOSIS — Z72.89 OTHER PROBLEMS RELATED TO LIFESTYLE: ICD-10-CM

## 2025-03-05 LAB
ABO + RH BLD: NORMAL
BLD GP AB SCN CELLS X3 SERPL QL: NORMAL
HBV SURFACE AG SERPL QL IA: NORMAL
HCV AB SERPL QL IA: NORMAL
HIV 1+2 AB+HIV1 P24 AG SERPL QL IA: NORMAL
TREPONEMA PALLIDUM IGG+IGM AB [PRESENCE] IN SERUM OR PLASMA BY IMMUNOASSAY: NONREACTIVE

## 2025-03-05 PROCEDURE — 87340 HEPATITIS B SURFACE AG IA: CPT | Performed by: OBSTETRICS & GYNECOLOGY

## 2025-03-05 PROCEDURE — 87389 HIV-1 AG W/HIV-1&-2 AB AG IA: CPT | Performed by: OBSTETRICS & GYNECOLOGY

## 2025-03-05 PROCEDURE — 36415 COLL VENOUS BLD VENIPUNCTURE: CPT | Performed by: OBSTETRICS & GYNECOLOGY

## 2025-03-05 PROCEDURE — 86901 BLOOD TYPING SEROLOGIC RH(D): CPT | Performed by: OBSTETRICS & GYNECOLOGY

## 2025-03-05 PROCEDURE — 86593 SYPHILIS TEST NON-TREP QUANT: CPT | Performed by: OBSTETRICS & GYNECOLOGY

## 2025-03-05 PROCEDURE — 81220 CFTR GENE COM VARIANTS: CPT | Performed by: OBSTETRICS & GYNECOLOGY

## 2025-03-05 PROCEDURE — 86762 RUBELLA ANTIBODY: CPT | Performed by: OBSTETRICS & GYNECOLOGY

## 2025-03-05 PROCEDURE — 86803 HEPATITIS C AB TEST: CPT | Performed by: OBSTETRICS & GYNECOLOGY

## 2025-03-06 LAB
RUBV IGG SER-ACNC: >400 IU/ML
RUBV IGG SER-IMP: REACTIVE

## 2025-03-10 ENCOUNTER — PATIENT MESSAGE (OUTPATIENT)
Dept: OBSTETRICS AND GYNECOLOGY | Facility: CLINIC | Age: 40
End: 2025-03-10
Payer: COMMERCIAL

## 2025-03-10 LAB
ABOUT THE TEST: NORMAL
APPROVED BY: NORMAL
FETAL FRACTION: NORMAL
FETAL SEX RESULT: NORMAL
GESTATIONAL AGE > 9: YES
GESTATIONAL AGE: NORMAL
LAB DIRECTOR COMMENTS: NORMAL
LIMITATIONS:: NORMAL
MONOSOMY X RESULT: NOT DETECTED
NEGATIVE PREDICTIVE VALUE: NORMAL
NOTE: NORMAL
PERFORMANCE CHARACTERISTICS: NORMAL
PERFORMANCE: NORMAL
POSITIVE PREDICTIVE VALUE: NORMAL
RESULT: NEGATIVE
SERVICE CMNT 04-IMP: NORMAL
TEST METHODOLOGY:: NORMAL
TRISOMY 13 (T13): NEGATIVE
TRISOMY 18: NEGATIVE
TRISOMY 21 (T21): NEGATIVE
XXX (TRIPLE X SYNDROME): NOT DETECTED
XXY (KLINEFELTER SYNDROME): NOT DETECTED
XYY (JACOBS SYNDROME): NOT DETECTED

## 2025-03-11 LAB — CFTR MUT ANL BLD/T: NORMAL

## 2025-03-19 ENCOUNTER — PATIENT MESSAGE (OUTPATIENT)
Dept: OBSTETRICS AND GYNECOLOGY | Facility: CLINIC | Age: 40
End: 2025-03-19
Payer: COMMERCIAL

## 2025-03-28 ENCOUNTER — ROUTINE PRENATAL (OUTPATIENT)
Dept: OBSTETRICS AND GYNECOLOGY | Facility: CLINIC | Age: 40
End: 2025-03-28
Payer: COMMERCIAL

## 2025-03-28 ENCOUNTER — TELEPHONE (OUTPATIENT)
Dept: OBSTETRICS AND GYNECOLOGY | Facility: HOSPITAL | Age: 40
End: 2025-03-28
Payer: COMMERCIAL

## 2025-03-28 ENCOUNTER — LAB VISIT (OUTPATIENT)
Dept: LAB | Facility: HOSPITAL | Age: 40
End: 2025-03-28
Attending: OBSTETRICS & GYNECOLOGY
Payer: COMMERCIAL

## 2025-03-28 VITALS
WEIGHT: 189.38 LBS | DIASTOLIC BLOOD PRESSURE: 72 MMHG | BODY MASS INDEX: 34.64 KG/M2 | SYSTOLIC BLOOD PRESSURE: 121 MMHG

## 2025-03-28 DIAGNOSIS — Z36.89 ENCOUNTER FOR FETAL ANATOMIC SURVEY: ICD-10-CM

## 2025-03-28 DIAGNOSIS — Z3A.13 13 WEEKS GESTATION OF PREGNANCY: Primary | ICD-10-CM

## 2025-03-28 DIAGNOSIS — Z3A.13 13 WEEKS GESTATION OF PREGNANCY: ICD-10-CM

## 2025-03-28 DIAGNOSIS — O09.522 MULTIGRAVIDA OF ADVANCED MATERNAL AGE IN SECOND TRIMESTER: ICD-10-CM

## 2025-03-28 LAB
ERYTHROCYTE [DISTWIDTH] IN BLOOD BY AUTOMATED COUNT: 12.9 % (ref 11.5–14.5)
HCT VFR BLD AUTO: 33.6 % (ref 37–48.5)
HGB BLD-MCNC: 11.4 GM/DL (ref 12–16)
MCH RBC QN AUTO: 31.8 PG (ref 27–50)
MCHC RBC AUTO-ENTMCNC: 33.9 G/DL (ref 32–36)
MCV RBC AUTO: 94 FL (ref 82–98)
PLATELET # BLD AUTO: 195 K/UL (ref 150–450)
PMV BLD AUTO: 11.8 FL (ref 9.2–12.9)
RBC # BLD AUTO: 3.58 M/UL (ref 4–5.4)
WBC # BLD AUTO: 5.77 K/UL (ref 3.9–12.7)

## 2025-03-28 PROCEDURE — 85027 COMPLETE CBC AUTOMATED: CPT

## 2025-03-28 PROCEDURE — 36415 COLL VENOUS BLD VENIPUNCTURE: CPT

## 2025-03-28 PROCEDURE — 99999 PR PBB SHADOW E&M-EST. PATIENT-LVL II: CPT | Mod: PBBFAC,,, | Performed by: OBSTETRICS & GYNECOLOGY

## 2025-03-28 NOTE — PROGRESS NOTES
Doing well, having some cramping once a day, no bleeding. No dysuria. Some constipation this am.     PE:   /72   Wt 85.9 kg (189 lb 6.4 oz)   LMP 12/25/2024 (Exact Date)   BMI 34.64 kg/m²     FHT normal      1. 13 weeks gestation of pregnancy        Plan:    1. IOB labs done   - MT21 negative   - its a boy   - anatomy  US ordered , will schedule    2  AMA:   - MT21 negative   - will start ASA when over 12 wga  - will get 32 week growth US   3. Fibroid: small   4. N/V: improving   5. Depression: on zoloft in the past, doesn't feel like she needs it and would prefer to be off.  Doing ok, will let me know if things change   6.  h/o trisomy 13

## 2025-04-03 ENCOUNTER — PATIENT MESSAGE (OUTPATIENT)
Dept: OBSTETRICS AND GYNECOLOGY | Facility: CLINIC | Age: 40
End: 2025-04-03
Payer: COMMERCIAL

## 2025-04-07 ENCOUNTER — PATIENT MESSAGE (OUTPATIENT)
Dept: OBSTETRICS AND GYNECOLOGY | Facility: CLINIC | Age: 40
End: 2025-04-07
Payer: COMMERCIAL

## 2025-04-07 NOTE — LETTER
April 16, 2025      [facility]  [address]  [address]  [place] [address]  Phone: [phone]       Patient: Grecia Carson   YOB: 1985  Date of Visit: 04/16/2025    To Whom It May Concern:    Ailyn Carson  was at [facility] on 03/30/2025 accompanied by Wing Foote. The patient may return to work/school on 03/31/2025 with no restrictions. If you have any questions or concerns, or if I can be of further assistance, please do not hesitate to contact me.    Sincerely,    Jyoti Reich MA

## 2025-04-15 ENCOUNTER — PATIENT MESSAGE (OUTPATIENT)
Dept: OTHER | Facility: OTHER | Age: 40
End: 2025-04-15
Payer: COMMERCIAL

## 2025-04-22 ENCOUNTER — PATIENT MESSAGE (OUTPATIENT)
Dept: OBSTETRICS AND GYNECOLOGY | Facility: CLINIC | Age: 40
End: 2025-04-22
Payer: COMMERCIAL

## 2025-04-22 ENCOUNTER — PATIENT MESSAGE (OUTPATIENT)
Dept: OTHER | Facility: OTHER | Age: 40
End: 2025-04-22
Payer: COMMERCIAL

## 2025-05-13 ENCOUNTER — PATIENT MESSAGE (OUTPATIENT)
Dept: OTHER | Facility: OTHER | Age: 40
End: 2025-05-13
Payer: COMMERCIAL

## 2025-06-10 ENCOUNTER — PATIENT MESSAGE (OUTPATIENT)
Dept: OTHER | Facility: OTHER | Age: 40
End: 2025-06-10
Payer: COMMERCIAL

## 2025-06-24 ENCOUNTER — PATIENT MESSAGE (OUTPATIENT)
Dept: OTHER | Facility: OTHER | Age: 40
End: 2025-06-24
Payer: COMMERCIAL

## 2025-06-25 ENCOUNTER — TELEPHONE (OUTPATIENT)
Dept: OBSTETRICS AND GYNECOLOGY | Facility: CLINIC | Age: 40
End: 2025-06-25
Payer: COMMERCIAL

## 2025-06-25 NOTE — TELEPHONE ENCOUNTER
Called patient to inform her that she was scheduled on a day were her provider will not be here and we were going to reschedule however she stated that she now is being seen by a different provider for this pregnancy and she stated that we could remove her from the schedule.     I've canceled all future appointments

## 2025-07-08 ENCOUNTER — PATIENT MESSAGE (OUTPATIENT)
Dept: OTHER | Facility: OTHER | Age: 40
End: 2025-07-08
Payer: COMMERCIAL

## 2025-07-22 ENCOUNTER — PATIENT MESSAGE (OUTPATIENT)
Dept: OTHER | Facility: OTHER | Age: 40
End: 2025-07-22
Payer: COMMERCIAL

## 2025-08-05 ENCOUNTER — PATIENT MESSAGE (OUTPATIENT)
Dept: OTHER | Facility: OTHER | Age: 40
End: 2025-08-05
Payer: COMMERCIAL

## 2025-08-26 ENCOUNTER — PATIENT MESSAGE (OUTPATIENT)
Dept: OTHER | Facility: OTHER | Age: 40
End: 2025-08-26
Payer: COMMERCIAL